# Patient Record
Sex: FEMALE | Race: BLACK OR AFRICAN AMERICAN
[De-identification: names, ages, dates, MRNs, and addresses within clinical notes are randomized per-mention and may not be internally consistent; named-entity substitution may affect disease eponyms.]

---

## 2018-04-24 ENCOUNTER — HOSPITAL ENCOUNTER (INPATIENT)
Dept: HOSPITAL 72 - EMR | Age: 83
LOS: 6 days | Discharge: NURSING, REHAB OR LONG-TERM CARE | DRG: 689 | End: 2018-04-30
Payer: MEDICARE

## 2018-04-24 VITALS — DIASTOLIC BLOOD PRESSURE: 89 MMHG | SYSTOLIC BLOOD PRESSURE: 168 MMHG

## 2018-04-24 VITALS — DIASTOLIC BLOOD PRESSURE: 73 MMHG | SYSTOLIC BLOOD PRESSURE: 136 MMHG

## 2018-04-24 VITALS — SYSTOLIC BLOOD PRESSURE: 151 MMHG | DIASTOLIC BLOOD PRESSURE: 57 MMHG

## 2018-04-24 VITALS — DIASTOLIC BLOOD PRESSURE: 71 MMHG | SYSTOLIC BLOOD PRESSURE: 164 MMHG

## 2018-04-24 VITALS — SYSTOLIC BLOOD PRESSURE: 116 MMHG | DIASTOLIC BLOOD PRESSURE: 69 MMHG

## 2018-04-24 VITALS — SYSTOLIC BLOOD PRESSURE: 96 MMHG | DIASTOLIC BLOOD PRESSURE: 55 MMHG

## 2018-04-24 VITALS — BODY MASS INDEX: 20.24 KG/M2 | HEIGHT: 62 IN | WEIGHT: 110 LBS

## 2018-04-24 DIAGNOSIS — G93.40: ICD-10-CM

## 2018-04-24 DIAGNOSIS — M81.0: ICD-10-CM

## 2018-04-24 DIAGNOSIS — N21.0: ICD-10-CM

## 2018-04-24 DIAGNOSIS — J44.9: ICD-10-CM

## 2018-04-24 DIAGNOSIS — E11.9: ICD-10-CM

## 2018-04-24 DIAGNOSIS — G89.4: ICD-10-CM

## 2018-04-24 DIAGNOSIS — E78.00: ICD-10-CM

## 2018-04-24 DIAGNOSIS — Z86.73: ICD-10-CM

## 2018-04-24 DIAGNOSIS — B96.20: ICD-10-CM

## 2018-04-24 DIAGNOSIS — R31.9: ICD-10-CM

## 2018-04-24 DIAGNOSIS — R33.9: ICD-10-CM

## 2018-04-24 DIAGNOSIS — K74.60: ICD-10-CM

## 2018-04-24 DIAGNOSIS — M15.9: ICD-10-CM

## 2018-04-24 DIAGNOSIS — Z88.6: ICD-10-CM

## 2018-04-24 DIAGNOSIS — F01.51: ICD-10-CM

## 2018-04-24 DIAGNOSIS — N31.2: ICD-10-CM

## 2018-04-24 DIAGNOSIS — N39.0: Primary | ICD-10-CM

## 2018-04-24 DIAGNOSIS — N81.10: ICD-10-CM

## 2018-04-24 LAB
ADD MANUAL DIFF: NO
ALBUMIN SERPL-MCNC: 3.1 G/DL (ref 3.4–5)
ALBUMIN/GLOB SERPL: 0.7 {RATIO} (ref 1–2.7)
ALP SERPL-CCNC: 67 U/L (ref 46–116)
ALT SERPL-CCNC: 23 U/L (ref 12–78)
ANION GAP SERPL CALC-SCNC: 6 MMOL/L (ref 5–15)
APPEARANCE UR: (no result)
APTT PPP: YELLOW S
AST SERPL-CCNC: 56 U/L (ref 15–37)
BASOPHILS NFR BLD AUTO: 2 % (ref 0–2)
BILIRUB SERPL-MCNC: 0.5 MG/DL (ref 0.2–1)
BUN SERPL-MCNC: 15 MG/DL (ref 7–18)
CALCIUM SERPL-MCNC: 9.5 MG/DL (ref 8.5–10.1)
CHLORIDE SERPL-SCNC: 107 MMOL/L (ref 98–107)
CO2 SERPL-SCNC: 26 MMOL/L (ref 21–32)
CREAT SERPL-MCNC: 0.7 MG/DL (ref 0.55–1.3)
EOSINOPHIL NFR BLD AUTO: 1.5 % (ref 0–3)
ERYTHROCYTE [DISTWIDTH] IN BLOOD BY AUTOMATED COUNT: 14.4 % (ref 11.6–14.8)
GLOBULIN SER-MCNC: 4.6 G/DL
GLUCOSE UR STRIP-MCNC: NEGATIVE MG/DL
HCT VFR BLD CALC: 36.2 % (ref 37–47)
HGB BLD-MCNC: 12 G/DL (ref 12–16)
KETONES UR QL STRIP: NEGATIVE
LEUKOCYTE ESTERASE UR QL STRIP: (no result)
LYMPHOCYTES NFR BLD AUTO: 30.8 % (ref 20–45)
MCV RBC AUTO: 91 FL (ref 80–99)
MONOCYTES NFR BLD AUTO: 9.2 % (ref 1–10)
NEUTROPHILS NFR BLD AUTO: 56.4 % (ref 45–75)
NITRITE UR QL STRIP: NEGATIVE
PH UR STRIP: 8 [PH] (ref 4.5–8)
PLATELET # BLD: 151 K/UL (ref 150–450)
POTASSIUM SERPL-SCNC: 4.9 MMOL/L (ref 3.5–5.1)
PROT UR QL STRIP: (no result)
RBC # BLD AUTO: 4 M/UL (ref 4.2–5.4)
SODIUM SERPL-SCNC: 139 MMOL/L (ref 136–145)
SP GR UR STRIP: 1.01 (ref 1–1.03)
UROBILINOGEN UR-MCNC: NORMAL MG/DL (ref 0–1)
WBC # BLD AUTO: 3.5 K/UL (ref 4.8–10.8)

## 2018-04-24 PROCEDURE — 86140 C-REACTIVE PROTEIN: CPT

## 2018-04-24 PROCEDURE — 80053 COMPREHEN METABOLIC PANEL: CPT

## 2018-04-24 PROCEDURE — 87181 SC STD AGAR DILUTION PER AGT: CPT

## 2018-04-24 PROCEDURE — 87086 URINE CULTURE/COLONY COUNT: CPT

## 2018-04-24 PROCEDURE — 85025 COMPLETE CBC W/AUTO DIFF WBC: CPT

## 2018-04-24 PROCEDURE — 83735 ASSAY OF MAGNESIUM: CPT

## 2018-04-24 PROCEDURE — 81003 URINALYSIS AUTO W/O SCOPE: CPT

## 2018-04-24 PROCEDURE — 74177 CT ABD & PELVIS W/CONTRAST: CPT

## 2018-04-24 PROCEDURE — 99285 EMERGENCY DEPT VISIT HI MDM: CPT

## 2018-04-24 PROCEDURE — 85007 BL SMEAR W/DIFF WBC COUNT: CPT

## 2018-04-24 PROCEDURE — 83036 HEMOGLOBIN GLYCOSYLATED A1C: CPT

## 2018-04-24 PROCEDURE — 83690 ASSAY OF LIPASE: CPT

## 2018-04-24 PROCEDURE — 84550 ASSAY OF BLOOD/URIC ACID: CPT

## 2018-04-24 PROCEDURE — 84100 ASSAY OF PHOSPHORUS: CPT

## 2018-04-24 PROCEDURE — 36415 COLL VENOUS BLD VENIPUNCTURE: CPT

## 2018-04-24 PROCEDURE — 84443 ASSAY THYROID STIM HORMONE: CPT

## 2018-04-24 PROCEDURE — 83880 ASSAY OF NATRIURETIC PEPTIDE: CPT

## 2018-04-24 RX ADMIN — DOCUSATE SODIUM SCH MG: 100 CAPSULE, LIQUID FILLED ORAL at 17:18

## 2018-04-24 NOTE — CONSULTATION
Consult Note


Consult Note


84-year-old female who presents with bilateral groin area pain and hematuria 

that started this morning


She straight caths herself for the past 13 years as she has a bladder prolapse 

issue and has difficulty emptying her bladder after a failed surgery to correct 

it


She denies fevers, nausea, vomiting, back pain, diarrhea, rectal bleeding, 

gynecologic complaints


She has not tried any medication for her pain, but reported constant achy and 

mild to moderate intensity, and she came mainly because she was worried about 

the blood she saw with catheterization


Allergies:  


Coded Allergies:  


     CODEINE (Verified  Allergy, Mild, Itching, 12/26/14)





Patient History


Past Medical History:  see triage record


Reviewed Nursing Documentation:  PMH: Agreed; PSxH: Agreed








Hx Cardiac Problems:  Yes


Hx Hypertension:  Yes


Hx Asthma:  Yes


Hx Diabetes:  Yes


Hx Cerebrovascular Accident:  Yes


Assessment/Plan





UTI


Bladder Prolapse


Psych Ds








tomas


Rocephin


? Uro eval


monitor renal parameters











NO WANG Apr 24, 2018 16:49

## 2018-04-24 NOTE — EMERGENCY ROOM REPORT
History of Present Illness


General


Chief Complaint:  Abdominal Pain


Source:  Patient, EMS





Present Illness


HPI


84-year-old female who presents with bilateral groin area pain and hematuria 

that started this morning


She straight caths herself for the past 13 years as she has a bladder prolapse 

issue and has difficulty emptying her bladder after a failed surgery to correct 

it


She denies fevers, nausea, vomiting, back pain, diarrhea, rectal bleeding, 

gynecologic complaints


She has not tried any medication for her pain, but reported constant achy and 

mild to moderate intensity, and she came mainly because she was worried about 

the blood she saw with catheterization


Allergies:  


Coded Allergies:  


     CODEINE (Verified  Allergy, Mild, Itching, 12/26/14)





Patient History


Past Medical History:  see triage record


Reviewed Nursing Documentation:  PMH: Agreed; PSxH: Agreed





Nursing Documentation-PMH


Hx Cardiac Problems:  Yes


Hx Hypertension:  Yes


Hx Asthma:  Yes


Hx Diabetes:  Yes


Hx Cancer:  No


Hx Gastrointestinal Problems:  No


Hx Cerebrovascular Accident:  Yes





Review of Systems


All Other Systems:  negative except mentioned in HPI





Physical Exam





Vital Signs








  Date Time  Temp Pulse Resp B/P (MAP) Pulse Ox O2 Delivery O2 Flow Rate FiO2


 


4/24/18 10:03 97.7 86 18 154/94 96 Room Air  





 97.7       








Sp02 EP Interpretation:  reviewed, normal


General Appearance:  no apparent distress, alert, non-toxic


Head:  normocephalic


Eyes:  bilateral eye normal inspection, bilateral eye PERRL, bilateral eye EOMI


ENT:  normal ENT inspection, hearing grossly normal, normal pharynx, no 

angioedema, normal voice, moist mucus membranes


Neck:  normal inspection, full range of motion, supple, supple/symm/no masses


Respiratory:  chest non-tender, lungs clear, normal breath sounds, chest 

symmetrical, palpation of chest normal


Cardiovascular #1:  normal peripheral pulses, regular rate, rhythm


Cardiovascular #2:  2+ radial (R), 2+ radial (L)


Gastrointestinal:  normal inspection, non tender, soft, no mass, no guarding, 

no rebound


Rectal:  deferred


Genitourinary:  normal inspection, no CVA tenderness


Musculoskeletal:  back normal, gait/station normal, normal range of motion, non-

tender, no calf tenderness


Neurologic:  alert, responsive, CNs III-XII nml as tested, motor strength/tone 

normal, sensory intact, speech normal


Psychiatric:  judgement/insight normal, memory normal, mood/affect normal, no 

suicidal/homicidal ideation


Skin:  normal color, no rash, warm/dry, normal turgor


Lymphatic:  no adenopathy





Medical Decision Making


Diagnostic Impression:  


 Primary Impression:  


 Urinary tract infection


 Additional Impression:  


 Hematuria


ER Course


84-year-old female who straight caths and has UTI with normal labs.  Treated 

UTI with IV Rocephin, will need to admit as recent Urine cx with MDR E coli.   

Still pending CT abdomen and pelvis, will admit.


Rhythm Strip Diag. Results


Rhythm Strip Time:  13:55


EP Interpretation:  yes


Rate:  63


Rhythm:  NSR, no PVC's, no ectopy





Last Vital Signs








  Date Time  Temp Pulse Resp B/P (MAP) Pulse Ox O2 Delivery O2 Flow Rate FiO2


 


4/24/18 10:03 97.7 86 18 154/94 96 Room Air  





 97.7       








Disposition:  ADMITTED AS INPATIENT


Condition:  Stable


Signed Out To:  ERIC Triplett M.D Apr 24, 2018 10:24

## 2018-04-24 NOTE — CONSULTATION
History of Present Illness


General


Date patient seen:  Apr 24, 2018


Time patient seen:  18:48


Chief Complaint:  Abdominal Pain


Referring physician:  Irena


Reason for Consultation:  Urinary retention





Present Illness


HPI


83 yo female with hx of bladder dysfunction/prolapse.  Failed repair.  Self 

catheterization dependent for past few years.  Noted hematuria on 

catheterization and abdominal pain.  Has had UTIs before.


Allergies:  


Coded Allergies:  


     CODEINE (Verified  Allergy, Mild, Itching, 12/26/14)





Medication History


Scheduled


Amlodipine Besylate (Norvasc), 5 MG ORAL DAILY


Aspirin (Ecotrin), 81 MG PO DAILY, (Reported)


Aspirin* (Aspir 81*), 81 MG ORAL DAILY, (Reported)


Docusate Sodium (Dok), 100 MG PO BID, (Reported)


Donepezil Hcl* (Donepezil Hcl*), 10 MG ORAL DAILY, (Reported)


Gabapentin* (Gabapentin*), 100 MG ORAL BID, (Reported)


Irbesartan* (Avapro*), 150 MG ORAL DAILY


Levofloxacin (Levofloxacin*), 500 MG ORAL DAILY, (Reported)


Megestrol Acetate (Megestrol Acetate), 40 MG PO BID, (Reported)


Nitrofurantoin Macrocrystal (Nitrofurantoin), 100 MG PO BID, (Reported)


Omega-3 Fatty Acids/Fish Oil (Fish Oil 1,000 Mg Capsule), 1 CAP ORAL DAILY, (

Reported)


Oxcarbazepine (Oxcarbazepine), 150 MG PO DAILY, (Reported)


Risperidone* (Risperdal*), 0.5 MG ORAL Q12HR


Simvastatin (Zocor), 20 MG ORAL DAILY, (Reported)


Thiamine Hcl (Vitamin B1*), 100 MG ORAL DAILY


Vitamin D (Vitamin D3), 1,000 UNITS ORAL DAILY, (Reported)





Scheduled PRN


Melatonin (Melatonin), 3 MG ORAL BEDTIME PRN for Insomnia, (Reported)





Miscellaneous Medications


[Simvastatin], (Reported)


[klonopin], (Reported)





Patient History


History Provided By:  Patient


Healthcare decision maker


N


Resuscitation status





Advanced Directive on File








Past Medical/Surgical History


Past Medical/Surgical History:  


(1) Vascular dementia with delirium


(2) Arterial ischemic stroke, multifocal, multiple vascular territories, chronic


(3) Stroke


(4) Urinary tract infection





Review of Systems


All Other Systems:  negative except mentioned in HPI





Physical Exam


General Appearance:  no apparent distress


HEENT:  normocephalic, atraumatic


Neck:  supple


Respiratory/Chest:  lungs clear


Cardiovascular/Chest:  normal rate


Abdomen:  soft


Genitourinary/Rectal:  normal genital exam





Last 24 Hour Vital Signs








  Date Time  Temp Pulse Resp B/P (MAP) Pulse Ox O2 Delivery O2 Flow Rate FiO2


 


4/24/18 16:19  68 17 148/67 98 Room Air  


 


4/24/18 16:00 97.3 57 19 148/73 94   





 97.3       


 


4/24/18 14:56  76 17 168/89 99 Room Air  


 


4/24/18 13:01  63 16 164/71 97 Room Air  


 


4/24/18 11:52 97.7       


 


4/24/18 11:18  64 19 151/57 100 Room Air  


 


4/24/18 11:13 97.7       


 


4/24/18 10:03 97.7 86 18 154/94 96 Room Air  





 97.7       











Laboratory Tests








Test


  4/24/18


10:32 4/24/18


10:39


 


White Blood Count


  3.5 K/UL


(4.8-10.8)  L 


 


 


Red Blood Count


  4.00 M/UL


(4.20-5.40)  L 


 


 


Hemoglobin


  12.0 G/DL


(12.0-16.0) 


 


 


Hematocrit


  36.2 %


(37.0-47.0)  L 


 


 


Mean Corpuscular Volume 91 FL (80-99)   


 


Mean Corpuscular Hemoglobin


  30.0 PG


(27.0-31.0) 


 


 


Mean Corpuscular Hemoglobin


Concent 33.1 G/DL


(32.0-36.0) 


 


 


Red Cell Distribution Width


  14.4 %


(11.6-14.8) 


 


 


Platelet Count


  151 K/UL


(150-450) 


 


 


Mean Platelet Volume


  8.8 FL


(6.5-10.1) 


 


 


Neutrophils (%) (Auto)


  56.4 %


(45.0-75.0) 


 


 


Lymphocytes (%) (Auto)


  30.8 %


(20.0-45.0) 


 


 


Monocytes (%) (Auto)


  9.2 %


(1.0-10.0) 


 


 


Eosinophils (%) (Auto)


  1.5 %


(0.0-3.0) 


 


 


Basophils (%) (Auto)


  2.0 %


(0.0-2.0) 


 


 


Sodium Level


  139 MMOL/L


(136-145) 


 


 


Potassium Level


  4.9 MMOL/L


(3.5-5.1) 


 


 


Chloride Level


  107 MMOL/L


() 


 


 


Carbon Dioxide Level


  26 MMOL/L


(21-32) 


 


 


Anion Gap


  6 mmol/L


(5-15) 


 


 


Blood Urea Nitrogen


  15 mg/dL


(7-18) 


 


 


Creatinine


  0.7 MG/DL


(0.55-1.30) 


 


 


Estimat Glomerular Filtration


Rate  mL/min (>60)  


  


 


 


Glucose Level


  85 MG/DL


() 


 


 


Calcium Level


  9.5 MG/DL


(8.5-10.1) 


 


 


Total Bilirubin


  0.5 MG/DL


(0.2-1.0) 


 


 


Aspartate Amino Transf


(AST/SGOT) 56 U/L (15-37)


H 


 


 


Alanine Aminotransferase


(ALT/SGPT) 23 U/L (12-78)


  


 


 


Alkaline Phosphatase


  67 U/L


() 


 


 


C-Reactive Protein,


Quantitative 0.9 mg/dL


(0.00-0.90) 


 


 


Total Protein


  7.7 G/DL


(6.4-8.2) 


 


 


Albumin


  3.1 G/DL


(3.4-5.0)  L 


 


 


Globulin 4.6 g/dL   


 


Albumin/Globulin Ratio


  0.7 (1.0-2.7)


L 


 


 


Lipase


  179 U/L


() 


 


 


Urine Color  Yellow  


 


Urine Appearance  Turbid  


 


Urine pH  8 (4.5-8.0)  


 


Urine Specific Gravity


  


  1.015


(1.005-1.035)


 


Urine Protein


  


  2+ (NEGATIVE)


H


 


Urine Glucose (UA)


  


  Negative


(NEGATIVE)


 


Urine Ketones


  


  Negative


(NEGATIVE)


 


Urine Occult Blood


  


  4+ (NEGATIVE)


H


 


Urine Nitrite


  


  Negative


(NEGATIVE)


 


Urine Bilirubin


  


  Negative


(NEGATIVE)


 


Urine Urobilinogen


  


  Normal MG/DL


(0.0-1.0)


 


Urine Leukocyte Esterase


  


  3+ (NEGATIVE)


H


 


Urine RBC


  


  5-10 /HPF (0 -


2)  H


 


Urine WBC


  


  30-40 /HPF (0


- 2)  H


 


Urine Squamous Epithelial


Cells 


  Moderate /LPF


(NONE/OCC)  H


 


Urine Bacteria


  


  Many /HPF


(NONE)  H








Height (Feet):  5


Height (Inches):  2.00


Weight (Pounds):  110


Medications





Current Medications








 Medications


  (Trade)  Dose


 Ordered  Sig/Carl


 Route


 PRN Reason  Start Time


 Stop Time Status Last Admin


Dose Admin


 


 Amlodipine


 Besylate


  (Norvasc)  5 mg  DAILY


 ORAL


   4/25/18 09:00


 5/25/18 08:59   


 


 


 Aspirin


  (Ecotrin)  81 mg  DAILY


 ORAL


   4/25/18 09:00


 5/25/18 08:59   


 


 


 Atorvastatin


 Calcium


  (Lipitor)  10 mg  QHS


 ORAL


   4/24/18 21:00


 5/24/18 20:59   


 


 


 Ceftriaxone


 Sodium 1 gm/


 Dextrose  55 ml @ 


 110 mls/hr  Q24H


 IVPB


   4/25/18 13:00


 5/2/18 12:59   


 


 


 Docusate Sodium


  (Colace)  100 mg  BID


 ORAL


   4/24/18 18:00


 5/24/18 17:59  4/24/18 17:18


 


 


 Donepezil HCl


  (Aricept)  10 mg  DAILY


 ORAL


   4/25/18 09:00


 5/25/18 08:59   


 


 


 Fish Oil


  (Fish Oil)  1,000 mg  DAILY


 ORAL


   4/25/18 09:00


 5/25/18 08:59   


 


 


 Gabapentin


  (Neurontin)  100 mg  BID


 ORAL


   4/24/18 18:00


 5/24/18 17:59  4/24/18 17:18


 


 


 Irbesartan


  (Avapro)  150 mg  DAILY


 ORAL


   4/25/18 09:00


 5/25/18 08:59   


 


 


 Megestrol Acetate


  (Megace)  40 mg  TWICE A  DAY


 ORAL


   4/24/18 18:00


 5/24/18 17:59  4/24/18 17:18


 


 


 Oxcarbazepine


  (Trileptal)  150 mg  DAILY


 ORAL


   4/25/18 09:00


 5/25/18 08:59   


 


 


 Risperidone


  (RisperDAL)  0.5 mg  Q12H  PRN


 ORAL


 Agitation  4/24/18 17:00


 5/24/18 16:59   


 


 


 Thiamine HCl


  (Vitamin B1)  100 mg  DAILY


 ORAL


   4/25/18 09:00


 5/25/18 08:59   


 


 


 Vitamin D


  (Vitamin D)  1,000 intlu  DAILY


 ORAL


   4/25/18 09:00


 5/25/18 08:59   


 








Objective Narrative


CT reviewed: distended bladder, thickened, small stones in bladder





Procedure: Under sterile conditions 18 [place] tomas catheter placed.  No blood 

seen.  Irrigated and no clots seen.





Assessment/Plan


Status:  stable


Assessment/Plan


83 yo female with nonfunctional bladder.  Self catheterizes 2-3 times a day.  

Recent hematuria noted.  UTI on workup.  Given bladder stones and chronic 

retention, tomas placed.  Recommend keeping tomas for duration of treatment.  

Given complex nature of UTI and likely drug resistance recommend 10-14 days.





1. IV abx


2. f/u urine culture


3. continue tomas till UTI treatment complete


4. f/u own urologist, Dr. Callahan.











Giovanni Bettencourt M.D. Apr 24, 2018 18:52

## 2018-04-24 NOTE — DIAGNOSTIC IMAGING REPORT
Clinical Indication: Abdominal pain, bilateral groin area pain and hematuria started

this morning

 

Technique:   Patient given oral contrast.  IV administration nonionic contrast.

Venous phase spiral acquisition obtained through the abdomen and pelvis. Multiplanar

reconstructions were generated. Total dose length product 578.51 mGycm. CTDIvol(s)

12.1 mGy. Dose reduction achieved using automated exposure control

 

 

Comparison: none

 

Findings: There are occasional colonic diverticula. No evidence of diverticulitis.

Moderate amount of retained stool is seen within the colon. The appendix is not

definitely visualized, but there are no findings to suggest acute appendicitis. No

small bowel distention or small bowel wall thickening. Contrast is seen throughout

the entirety of the small bowel and well into the colon. There is some wall

thickening of the distal esophagus. The stomach is grossly unremarkable. The duodenum

is unremarkable. No free intraperitoneal air is evident. A small amount of fluid is

seen within the pelvis and over the dome of the liver.

 

The liver demonstrates surface nodularity. Recannulized paraumbilical vein varices

are demonstrated. The portal vein is somewhat dilated. No focal hepatic abnormality

demonstrated. The gallbladder is unremarkable. The pancreas, spleen, adrenals,

kidneys are unremarkable. No retroperitoneal or mesenteric mass or adenopathy. No

pelvic mass or adenopathy. The bladder demonstrates diffuse wall thickening. Calculi

are seen dependently within the bladder lumen posteriorly. Some of these may be

within the bladder wall as well.

 

The included lung bases are clear except for some posterior dependent atelectatic

changes. The bones demonstrate a anterior wedge/burst compression fracture deformity

of the T11 vertebral body, with considerable retropulsion of the posterior wall. The

acuity of this is indeterminate, but sclerosis of the posterior body suggests that

this is chronic. There is also a less severe wedge compression fracture deformity of

the T9 vertebral body. There are degenerative changes of the lower lumbar spine.

There is suggestion of slight superior endplate compression deformities of the L3 and

L5 vertebral segments.

 

Impression: Thick walled bladder. This could indicate cystitis or chronic bladder

outlet obstruction. Dependent calcifications are seen within the bladder lumen. Some

of these may also be within the bladder wall.

 

Hepatic surface nodularity consistent with cirrhosis

 

Evidence of portal hypertension, with recanalized periumbilical vein varices and

trace ascites

 

Colonic diverticulosis. No evidence of diverticulitis

 

Moderate retained stool; correlate with any history of constipation

 

Distal esophageal mild wall thickening, could indicate esophagitis. Correlate with

clinical findings

 

Anterior wedge/burst compression fracture deformity of the T11 vertebral body with

posterior retropulsion. Age indeterminate although suspect chronic. Consider MRI for

further evaluation if this is symptomatic or otherwise clinically relevant

 

Age-indeterminate less severe wedge compression fracture deformity of the T9

vertebral body. There are are also possible slight superior endplate compression

deformities of the L3 and L5 vertebral bodies

 

Posterior dependent pulmonary atelectasis, degenerative lumbar spondylosis

incidentally noted

 

The CT scanner at [facility] is accredited by the [employer] College of

Radiology and the scans are performed using protocols designed to limit radiation

exposure to as low as reasonably achievable to attain images of sufficient resolution

adequate for diagnostic evaluation.

## 2018-04-25 VITALS — DIASTOLIC BLOOD PRESSURE: 57 MMHG | SYSTOLIC BLOOD PRESSURE: 109 MMHG

## 2018-04-25 VITALS — SYSTOLIC BLOOD PRESSURE: 121 MMHG | DIASTOLIC BLOOD PRESSURE: 79 MMHG

## 2018-04-25 VITALS — DIASTOLIC BLOOD PRESSURE: 71 MMHG | SYSTOLIC BLOOD PRESSURE: 149 MMHG

## 2018-04-25 VITALS — DIASTOLIC BLOOD PRESSURE: 64 MMHG | SYSTOLIC BLOOD PRESSURE: 118 MMHG

## 2018-04-25 LAB
ADD MANUAL DIFF: NO
ALBUMIN SERPL-MCNC: 3.2 G/DL (ref 3.4–5)
ALBUMIN/GLOB SERPL: 0.8 {RATIO} (ref 1–2.7)
ALP SERPL-CCNC: 67 U/L (ref 46–116)
ALT SERPL-CCNC: 23 U/L (ref 12–78)
ANION GAP SERPL CALC-SCNC: 9 MMOL/L (ref 5–15)
AST SERPL-CCNC: 27 U/L (ref 15–37)
BASOPHILS NFR BLD AUTO: 1.8 % (ref 0–2)
BILIRUB SERPL-MCNC: 0.4 MG/DL (ref 0.2–1)
BUN SERPL-MCNC: 12 MG/DL (ref 7–18)
CALCIUM SERPL-MCNC: 9.4 MG/DL (ref 8.5–10.1)
CHLORIDE SERPL-SCNC: 104 MMOL/L (ref 98–107)
CO2 SERPL-SCNC: 27 MMOL/L (ref 21–32)
CREAT SERPL-MCNC: 0.8 MG/DL (ref 0.55–1.3)
EOSINOPHIL NFR BLD AUTO: 1.1 % (ref 0–3)
ERYTHROCYTE [DISTWIDTH] IN BLOOD BY AUTOMATED COUNT: 14.3 % (ref 11.6–14.8)
GLOBULIN SER-MCNC: 4.2 G/DL
HCT VFR BLD CALC: 35.1 % (ref 37–47)
HGB BLD-MCNC: 12 G/DL (ref 12–16)
LYMPHOCYTES NFR BLD AUTO: 30.5 % (ref 20–45)
MCV RBC AUTO: 91 FL (ref 80–99)
MONOCYTES NFR BLD AUTO: 8.8 % (ref 1–10)
NEUTROPHILS NFR BLD AUTO: 57.8 % (ref 45–75)
PHOSPHATE SERPL-MCNC: 3.9 MG/DL (ref 2.5–4.9)
PLATELET # BLD: 166 K/UL (ref 150–450)
POTASSIUM SERPL-SCNC: 3.8 MMOL/L (ref 3.5–5.1)
RBC # BLD AUTO: 3.86 M/UL (ref 4.2–5.4)
SODIUM SERPL-SCNC: 140 MMOL/L (ref 136–145)
WBC # BLD AUTO: 3.9 K/UL (ref 4.8–10.8)

## 2018-04-25 RX ADMIN — VITAMIN D, TAB 1000IU (100/BT) SCH INTLU: 25 TAB at 08:18

## 2018-04-25 RX ADMIN — DEXTROSE MONOHYDRATE SCH MLS/HR: 50 INJECTION, SOLUTION INTRAVENOUS at 21:59

## 2018-04-25 RX ADMIN — Medication SCH MG: at 08:18

## 2018-04-25 RX ADMIN — DOCUSATE SODIUM SCH MG: 100 CAPSULE, LIQUID FILLED ORAL at 16:57

## 2018-04-25 RX ADMIN — OXCARBAZEPINE SCH MG: 150 TABLET, FILM COATED ORAL at 08:18

## 2018-04-25 RX ADMIN — ANALGESIC BALM SCH APPLIC: 1.74; 4.06 OINTMENT TOPICAL at 21:00

## 2018-04-25 RX ADMIN — ASPIRIN SCH MG: 81 TABLET, DELAYED RELEASE ORAL at 09:00

## 2018-04-25 RX ADMIN — DONEPEZIL HYDROCHLORIDE SCH MG: 10 TABLET, FILM COATED ORAL at 08:17

## 2018-04-25 RX ADMIN — DOCUSATE SODIUM SCH MG: 100 CAPSULE, LIQUID FILLED ORAL at 08:17

## 2018-04-25 RX ADMIN — ASPIRIN SCH MG: 81 TABLET, DELAYED RELEASE ORAL at 08:17

## 2018-04-25 RX ADMIN — DEXTROSE MONOHYDRATE SCH MLS/HR: 50 INJECTION, SOLUTION INTRAVENOUS at 13:52

## 2018-04-25 RX ADMIN — IRBESARTAN SCH MG: 150 TABLET, FILM COATED ORAL at 08:18

## 2018-04-25 NOTE — CONSULTATION
History of Present Illness


General


Date patient seen:  Apr 25, 2018


Chief Complaint:  Abdominal Pain


Referring physician:  Irena


Reason for Consultation:  Urinary retention





Present Illness


Allergies:  


Coded Allergies:  


     CODEINE (Verified  Allergy, Mild, Itching, 12/26/14)





Medication History


Scheduled


Amlodipine Besylate (Norvasc), 5 MG ORAL DAILY


Aspirin (Ecotrin), 81 MG PO DAILY, (Reported)


Aspirin* (Aspir 81*), 81 MG ORAL DAILY, (Reported)


Docusate Sodium (Dok), 100 MG PO BID, (Reported)


Donepezil Hcl* (Donepezil Hcl*), 10 MG ORAL DAILY, (Reported)


Gabapentin* (Gabapentin*), 100 MG ORAL BID, (Reported)


Irbesartan* (Avapro*), 150 MG ORAL DAILY


Levofloxacin (Levofloxacin*), 500 MG ORAL DAILY, (Reported)


Megestrol Acetate (Megestrol Acetate), 40 MG PO BID, (Reported)


Nitrofurantoin Macrocrystal (Nitrofurantoin), 100 MG PO BID, (Reported)


Omega-3 Fatty Acids/Fish Oil (Fish Oil 1,000 Mg Capsule), 1 CAP ORAL DAILY, (

Reported)


Oxcarbazepine (Oxcarbazepine), 150 MG PO DAILY, (Reported)


Risperidone* (Risperdal*), 0.5 MG ORAL Q12HR


Simvastatin (Zocor), 20 MG ORAL DAILY, (Reported)


Thiamine Hcl (Vitamin B1*), 100 MG ORAL DAILY


Vitamin D (Vitamin D3), 1,000 UNITS ORAL DAILY, (Reported)





Scheduled PRN


Melatonin (Melatonin), 3 MG ORAL BEDTIME PRN for Insomnia, (Reported)





Miscellaneous Medications


[Simvastatin], (Reported)


[klonopin], (Reported)





Patient History


Healthcare decision maker


N


Resuscitation status





Advanced Directive on File








Physical Exam





Last 24 Hour Vital Signs








  Date Time  Temp Pulse Resp B/P (MAP) Pulse Ox O2 Delivery O2 Flow Rate FiO2


 


4/25/18 16:00 97.6 70 19 118/64 98   





 97.6       


 


4/25/18 12:00 99.5 72 20 109/57 97   





 99.5       


 


4/25/18 08:51 97.9 74 16 149/71 96   





 97.9       


 


4/25/18 08:19  74  149/71    


 


4/25/18 08:18    149/71    


 


4/24/18 23:50 97.2 60 18 96/55 97   





 97.2       


 


4/24/18 20:00 97.5 62 18 116/69 97   





 97.5       

















Intake and Output  


 


 4/24/18 4/25/18





 19:00 07:00


 


Intake Total 795 ml 295 ml


 


Output Total 75 ml 1450 ml


 


Balance 720 ml -1155 ml


 


  


 


Intake Oral 240 ml 295 ml


 


IV Total 555 ml 


 


Output Urine Total 75 ml 1450 ml











Laboratory Tests








Test


  4/25/18


10:50


 


White Blood Count


  3.9 K/UL


(4.8-10.8)  L


 


Red Blood Count


  3.86 M/UL


(4.20-5.40)  L


 


Hemoglobin


  12.0 G/DL


(12.0-16.0)


 


Hematocrit


  35.1 %


(37.0-47.0)  L


 


Mean Corpuscular Volume 91 FL (80-99)  


 


Mean Corpuscular Hemoglobin


  31.0 PG


(27.0-31.0)


 


Mean Corpuscular Hemoglobin


Concent 34.1 G/DL


(32.0-36.0)


 


Red Cell Distribution Width


  14.3 %


(11.6-14.8)


 


Platelet Count


  166 K/UL


(150-450)


 


Mean Platelet Volume


  9.5 FL


(6.5-10.1)


 


Neutrophils (%) (Auto)


  57.8 %


(45.0-75.0)


 


Lymphocytes (%) (Auto)


  30.5 %


(20.0-45.0)


 


Monocytes (%) (Auto)


  8.8 %


(1.0-10.0)


 


Eosinophils (%) (Auto)


  1.1 %


(0.0-3.0)


 


Basophils (%) (Auto)


  1.8 %


(0.0-2.0)


 


Sodium Level


  140 MMOL/L


(136-145)


 


Potassium Level


  3.8 MMOL/L


(3.5-5.1)


 


Chloride Level


  104 MMOL/L


()


 


Carbon Dioxide Level


  27 MMOL/L


(21-32)


 


Anion Gap


  9 mmol/L


(5-15)


 


Blood Urea Nitrogen


  12 mg/dL


(7-18)


 


Creatinine


  0.8 MG/DL


(0.55-1.30)


 


Estimat Glomerular Filtration


Rate  mL/min (>60)  


 


 


Glucose Level


  77 MG/DL


()


 


Hemoglobin A1c


  5.3 %


(4.3-6.0)


 


Uric Acid


  4.4 MG/DL


(2.6-7.2)


 


Calcium Level


  9.4 MG/DL


(8.5-10.1)


 


Phosphorus Level


  3.9 MG/DL


(2.5-4.9)


 


Magnesium Level


  1.9 MG/DL


(1.8-2.4)


 


Total Bilirubin


  0.4 MG/DL


(0.2-1.0)


 


Aspartate Amino Transf


(AST/SGOT) 27 U/L (15-37)


 


 


Alanine Aminotransferase


(ALT/SGPT) 23 U/L (12-78)


 


 


Alkaline Phosphatase


  67 U/L


()


 


Pro-B-Type Natriuretic Peptide


  109 pg/mL


(0-125)


 


Total Protein


  7.4 G/DL


(6.4-8.2)


 


Albumin


  3.2 G/DL


(3.4-5.0)  L


 


Globulin 4.2 g/dL  


 


Albumin/Globulin Ratio


  0.8 (1.0-2.7)


L


 


Thyroid Stimulating Hormone


(TSH) 1.838 uiU/mL


(0.358-3.740)








Height (Feet):  5


Height (Inches):  2.00


Weight (Pounds):  110


Medications





Current Medications








 Medications


  (Trade)  Dose


 Ordered  Sig/Carl


 Route


 PRN Reason  Start Time


 Stop Time Status Last Admin


Dose Admin


 


 Acetaminophen


  (Tylenol)  650 mg  QIDPRN  PRN


 ORAL


 Mild Pain/Temp > 100.5  4/25/18 09:15


 5/25/18 09:14  4/25/18 09:22


 


 


 Amlodipine


 Besylate


  (Norvasc)  5 mg  DAILY


 ORAL


   4/25/18 09:00


 5/25/18 08:59  4/25/18 08:19


 


 


 Aspirin


  (Ecotrin)  81 mg  DAILY


 ORAL


   4/25/18 09:00


 5/25/18 08:59   


 


 


 Atorvastatin


 Calcium


  (Lipitor)  10 mg  QHS


 ORAL


   4/24/18 21:00


 5/24/18 20:59  4/24/18 21:28


 


 


 Docusate Sodium


  (Colace)  100 mg  BID


 ORAL


   4/24/18 18:00


 5/24/18 17:59  4/25/18 16:57


 


 


 Donepezil HCl


  (Aricept)  10 mg  DAILY


 ORAL


   4/25/18 09:00


 5/25/18 08:59  4/25/18 08:17


 


 


 Fish Oil


  (Fish Oil)  1,000 mg  DAILY


 ORAL


   4/25/18 09:00


 5/25/18 08:59  4/25/18 08:17


 


 


 Gabapentin


  (Neurontin)  100 mg  BID


 ORAL


   4/24/18 18:00


 5/24/18 17:59  4/25/18 16:57


 


 


 Irbesartan


  (Avapro)  150 mg  DAILY


 ORAL


   4/25/18 09:00


 5/25/18 08:59  4/25/18 08:18


 


 


 Megestrol Acetate


  (Megace)  40 mg  TWICE A  DAY


 ORAL


   4/24/18 18:00


 5/24/18 17:59  4/25/18 16:57


 


 


 Oxcarbazepine


  (Trileptal)  150 mg  DAILY


 ORAL


   4/25/18 09:00


 5/25/18 08:59  4/25/18 08:18


 


 


 Piperacillin Sod/


 Tazobactam Sod


 3.375 gm/Dextrose  110 ml @ 


 27.5 mls/hr  EVERY 8  HOURS


 IVPB


   4/25/18 14:00


 4/30/18 13:59  4/25/18 13:52


 


 


 Risperidone


  (RisperDAL)  0.5 mg  Q12H  PRN


 ORAL


 Agitation  4/24/18 17:00


 5/24/18 16:59   


 


 


 Thiamine HCl


  (Vitamin B1)  100 mg  DAILY


 ORAL


   4/25/18 09:00


 5/25/18 08:59  4/25/18 08:18


 


 


 Vitamin D


  (Vitamin D)  1,000 intlu  DAILY


 ORAL


   4/25/18 09:00


 5/25/18 08:59  4/25/18 08:18


 











Assessment/Plan


Assessment/Plan


(1) Multiple Joint OA


(2) Multiple Joint Pain


seen dictated











KVNG DOMÍNGUEZ Apr 25, 2018 19:04

## 2018-04-25 NOTE — CONSULTATION
DATE OF CONSULTATION:  04/25/2018



INFECTIOUS DISEASE CONSULTATION



CONSULTING PHYSICIAN:  Jarrett Doherty M.D.



PRIMARY ATTENDING PHYSICIAN:  Kylah Osborn M.D.



REASON FOR CONSULT:  Complicated UTI.



HISTORY OF PRESENT ILLNESS:  This is an 84-year-old 

female, admitted yesterday complaining of abdominal pain and hematuria.

The patient has history of bladder prolapse and had failed correction surgery

many years ago, and in the past 13 years, she used self-catheterization

two to three times a day.  At the time of admission, had groin pain

bilaterally.



PAST MEDICAL HISTORY:  Significant for urinary retention, bladder prolapse,

and dementia.



ALLERGIES:  The patient is allergic to codeine.



MEDICATIONS:  Getting ceftriaxone, Tylenol, Norvasc, aspirin, thiamine,

Aricept, fish oil, Trileptal, Lipitor, Colace, Megace, and Risperdal.



SOCIAL HISTORY:  Lives at home.  No history of drinking for 25 years.  Quit

smoking many years ago.  .



REVIEW OF SYSTEMS:  She has poor appetite.  No nausea.  No vomiting.  No

diarrhea.  No fever.  At the time of examination, no abdominal pain, but

has left thigh pain.  Has on and off back pain.



PHYSICAL EXAMINATION:

VITAL SIGNS:  Temperature 97.9 degrees, pulse 74, and blood pressure is

149/71.

GENERAL APPEARANCE:  No acute distress.

HEAD AND NECK:  Has denture.  No oral lesion.

HEART:  Regular.

LUNGS:  Clear.

ABDOMEN:  Soft.

EXTREMITIES:  She has no edema.

GENITOURINARY:  She has Torres catheter with hematuria.



LABORATORY AND DIAGNOSTIC DATA:  WBC 3.5, hemoglobin 12, hematocrit 36.2,

and platelets 151.  Sodium 139, potassium 4.9, chloride 107, bicarb 26,

BUN 15, creatinine 0.5, and glucose 85.  AST was elevated at 56.  The

patient had a CT scan of the abdomen and pelvis, which showed evidence of

thickened bladder wall, likely cystitis, cirrhosis with portal

hypertension, diverticulosis without diverticulitis, compression fracture

of T9 and T11.  Blood culture growing Gram-negative bacilli and

gram-positive organism.



IMPRESSION:  Complicated urinary tract infection in a patient, who had

history of bladder prolapse and dysfunctioning.  Has hematuria.  Has

evidence of cirrhosis, portal hypertension, diverticulosis, and has

dementia.



RECOMMENDATION:  We will change antibiotic from Rocephin to Zosyn.  We will

follow up the cultures.



At the end of my exam, I thank Dr. Osborn for involving me in the care

of this patient.









  ______________________________________________

  Jarrett Doherty M.D.





DR:  PRINCESS

D:  04/25/2018 11:21

T:  04/25/2018 21:28

JOB#:  [id number]

CC:



LEX

## 2018-04-26 VITALS — DIASTOLIC BLOOD PRESSURE: 55 MMHG | SYSTOLIC BLOOD PRESSURE: 98 MMHG

## 2018-04-26 VITALS — SYSTOLIC BLOOD PRESSURE: 158 MMHG | DIASTOLIC BLOOD PRESSURE: 85 MMHG

## 2018-04-26 VITALS — DIASTOLIC BLOOD PRESSURE: 57 MMHG | SYSTOLIC BLOOD PRESSURE: 98 MMHG

## 2018-04-26 VITALS — DIASTOLIC BLOOD PRESSURE: 62 MMHG | SYSTOLIC BLOOD PRESSURE: 101 MMHG

## 2018-04-26 LAB
ADD MANUAL DIFF: YES
ALBUMIN SERPL-MCNC: 2.7 G/DL (ref 3.4–5)
ALBUMIN/GLOB SERPL: 0.7 {RATIO} (ref 1–2.7)
ALP SERPL-CCNC: 59 U/L (ref 46–116)
ALT SERPL-CCNC: 19 U/L (ref 12–78)
ANION GAP SERPL CALC-SCNC: 10 MMOL/L (ref 5–15)
AST SERPL-CCNC: 27 U/L (ref 15–37)
BILIRUB SERPL-MCNC: 0.3 MG/DL (ref 0.2–1)
BUN SERPL-MCNC: 14 MG/DL (ref 7–18)
CALCIUM SERPL-MCNC: 8.9 MG/DL (ref 8.5–10.1)
CHLORIDE SERPL-SCNC: 107 MMOL/L (ref 98–107)
CO2 SERPL-SCNC: 25 MMOL/L (ref 21–32)
CREAT SERPL-MCNC: 1 MG/DL (ref 0.55–1.3)
ERYTHROCYTE [DISTWIDTH] IN BLOOD BY AUTOMATED COUNT: 14.2 % (ref 11.6–14.8)
GLOBULIN SER-MCNC: 3.8 G/DL
HCT VFR BLD CALC: 32.7 % (ref 37–47)
HGB BLD-MCNC: 11.1 G/DL (ref 12–16)
MCV RBC AUTO: 90 FL (ref 80–99)
PLATELET # BLD: 157 K/UL (ref 150–450)
POTASSIUM SERPL-SCNC: 4.1 MMOL/L (ref 3.5–5.1)
RBC # BLD AUTO: 3.65 M/UL (ref 4.2–5.4)
SODIUM SERPL-SCNC: 142 MMOL/L (ref 136–145)
WBC # BLD AUTO: 3.4 K/UL (ref 4.8–10.8)

## 2018-04-26 RX ADMIN — SODIUM CHLORIDE SCH MLS/HR: 0.9 INJECTION INTRAVENOUS at 14:59

## 2018-04-26 RX ADMIN — ANALGESIC BALM SCH APPLIC: 1.74; 4.06 OINTMENT TOPICAL at 09:00

## 2018-04-26 RX ADMIN — DEXTROSE MONOHYDRATE SCH MLS/HR: 50 INJECTION, SOLUTION INTRAVENOUS at 06:06

## 2018-04-26 RX ADMIN — IRBESARTAN SCH MG: 150 TABLET, FILM COATED ORAL at 09:00

## 2018-04-26 RX ADMIN — DOCUSATE SODIUM SCH MG: 100 CAPSULE, LIQUID FILLED ORAL at 17:09

## 2018-04-26 RX ADMIN — Medication SCH MG: at 09:12

## 2018-04-26 RX ADMIN — ANALGESIC BALM SCH APPLIC: 1.74; 4.06 OINTMENT TOPICAL at 13:00

## 2018-04-26 RX ADMIN — ASPIRIN SCH MG: 81 TABLET, DELAYED RELEASE ORAL at 09:11

## 2018-04-26 RX ADMIN — DOCUSATE SODIUM SCH MG: 100 CAPSULE, LIQUID FILLED ORAL at 09:12

## 2018-04-26 RX ADMIN — OXCARBAZEPINE SCH MG: 150 TABLET, FILM COATED ORAL at 09:11

## 2018-04-26 RX ADMIN — VITAMIN D, TAB 1000IU (100/BT) SCH INTLU: 25 TAB at 09:11

## 2018-04-26 RX ADMIN — DONEPEZIL HYDROCHLORIDE SCH MG: 10 TABLET, FILM COATED ORAL at 09:11

## 2018-04-26 NOTE — GENERAL PROGRESS NOTE
Assessment/Plan


Problem List:  


(1) Urinary tract infection


ICD Codes:  N39.0 - Urinary tract infection, site not specified


SNOMED:  28430745


(2) Hematuria


ICD Codes:  R31.9 - Hematuria, unspecified


SNOMED:  08339714


(3) HTN (hypertension), benign


ICD Codes:  I10 - HTN (hypertension), benign


SNOMED:  00589455


Status:  progressing


Assessment/Plan


uti is improving


abx per id


afebrile


vitals stable


atonic bladder 


self caths prn





Subjective


ROS Limited/Unobtainable:  Yes


Allergies:  


Coded Allergies:  


     CODEINE (Verified  Allergy, Mild, Itching, 12/26/14)





Objective





Last 24 Hour Vital Signs








  Date Time  Temp Pulse Resp B/P (MAP) Pulse Ox O2 Delivery O2 Flow Rate FiO2


 


4/26/18 17:21 98.1       


 


4/26/18 16:22 98.1       


 


4/26/18 15:57 98.1 82 20 98/55 99   





 98.1       


 


4/26/18 11:47 98.2 72 20 101/62 98   





 98.2       


 


4/26/18 09:12 97.7       


 


4/26/18 09:00    98/57    


 


4/26/18 09:00  74  98/57    


 


4/26/18 08:20 97.7 74 20 98/57 98   





 97.7       

















Intake and Output  


 


 4/25/18 4/26/18





 19:00 07:00


 


Intake Total 360 ml 360 ml


 


Output Total 450 ml 1500 ml


 


Balance -90 ml -1140 ml


 


  


 


Intake Oral 360 ml 360 ml


 


Output Urine Total 450 ml 1500 ml


 


# Bowel Movements 1 








Laboratory Tests


4/26/18 05:45: 


White Blood Count 3.4L, Red Blood Count 3.65L, Hemoglobin 11.1L, Hematocrit 

32.7L, Mean Corpuscular Volume 90, Mean Corpuscular Hemoglobin 30.5, Mean 

Corpuscular Hemoglobin Concent 34.0, Red Cell Distribution Width 14.2, Platelet 

Count 157, Mean Platelet Volume 9.0, Neutrophils (%) (Auto) , Lymphocytes (%) (

Auto) , Monocytes (%) (Auto) , Eosinophils (%) (Auto) , Basophils (%) (Auto) , 

Differential Total Cells Counted 100, Neutrophils % (Manual) 49, Lymphocytes % (

Manual) 42, Monocytes % (Manual) 7, Eosinophils % (Manual) 2, Basophils % (

Manual) 0, Band Neutrophils 0, Platelet Estimate Adequate, Platelet Morphology 

Normal, Anisocytosis 1+, Sodium Level 142, Potassium Level 4.1, Chloride Level 

107, Carbon Dioxide Level 25, Anion Gap 10, Blood Urea Nitrogen 14, Creatinine 

1.0, Estimat Glomerular Filtration Rate , Glucose Level 77, Calcium Level 8.9, 

Total Bilirubin 0.3, Aspartate Amino Transf (AST/SGOT) 27, Alanine 

Aminotransferase (ALT/SGPT) 19, Alkaline Phosphatase 59, Total Protein 6.5, 

Albumin 2.7L, Globulin 3.8, Albumin/Globulin Ratio 0.7L


Height (Feet):  5


Height (Inches):  2.00


Weight (Pounds):  110











Kylah Osborn MD Apr 26, 2018 21:23

## 2018-04-26 NOTE — GENERAL PROGRESS NOTE
Assessment/Plan


Status:  stable, progressing


Assessment/Plan


Encephalopathy


Dementia with behavioral disturbance





-cont risperdal prn


-cont Aricept.


-the pt was given ro





Subjective


Date patient seen:  Apr 26, 2018


Neurologic/Psychiatric:  Reports: anxiety, depressed, emotional problems


Allergies:  


Coded Allergies:  


     CODEINE (Verified  Allergy, Mild, Itching, 12/26/14)


Subjective


the pt is less congused today and more engaged.





Objective





Last 24 Hour Vital Signs








  Date Time  Temp Pulse Resp B/P (MAP) Pulse Ox O2 Delivery O2 Flow Rate FiO2


 


4/26/18 11:47 98.2 72 20 101/62 98   





 98.2       


 


4/26/18 10:11 97.7       


 


4/26/18 09:12 97.7       


 


4/26/18 09:00    98/57    


 


4/26/18 09:00  74  98/57    


 


4/26/18 08:20 97.7 74 20 98/57 98   





 97.7       


 


4/25/18 21:00 99.7 69 18 121/79 98   





 99.7       


 


4/25/18 16:00 97.6 70 19 118/64 98   





 97.6       

















Intake and Output  


 


 4/25/18 4/26/18





 19:00 07:00


 


Intake Total 360 ml 360 ml


 


Output Total 450 ml 1500 ml


 


Balance -90 ml -1140 ml


 


  


 


Intake Oral 360 ml 360 ml


 


Output Urine Total 450 ml 1500 ml


 


# Bowel Movements 1 








Laboratory Tests


4/26/18 05:45: 


White Blood Count 3.4L, Red Blood Count 3.65L, Hemoglobin 11.1L, Hematocrit 

32.7L, Mean Corpuscular Volume 90, Mean Corpuscular Hemoglobin 30.5, Mean 

Corpuscular Hemoglobin Concent 34.0, Red Cell Distribution Width 14.2, Platelet 

Count 157, Mean Platelet Volume 9.0, Neutrophils (%) (Auto) , Lymphocytes (%) (

Auto) , Monocytes (%) (Auto) , Eosinophils (%) (Auto) , Basophils (%) (Auto) , 

Differential Total Cells Counted 100, Neutrophils % (Manual) 49, Lymphocytes % (

Manual) 42, Monocytes % (Manual) 7, Eosinophils % (Manual) 2, Basophils % (

Manual) 0, Band Neutrophils 0, Platelet Estimate Adequate, Platelet Morphology 

Normal, Anisocytosis 1+, Sodium Level 142, Potassium Level 4.1, Chloride Level 

107, Carbon Dioxide Level 25, Anion Gap 10, Blood Urea Nitrogen 14, Creatinine 

1.0, Estimat Glomerular Filtration Rate , Glucose Level 77, Calcium Level 8.9, 

Total Bilirubin 0.3, Aspartate Amino Transf (AST/SGOT) 27, Alanine 

Aminotransferase (ALT/SGPT) 19, Alkaline Phosphatase 59, Total Protein 6.5, 

Albumin 2.7L, Globulin 3.8, Albumin/Globulin Ratio 0.7L


Height (Feet):  5


Height (Inches):  2.00


Weight (Pounds):  110


General Appearance:  no apparent distress, alert


Neurologic:  oriented x 3, responsive, depressed affect











Phoenix Izquierdo M.D. Apr 26, 2018 12:53

## 2018-04-26 NOTE — HISTORY AND PHYSICAL REPORT
DATE OF ADMISSION:  04/24/2018



NOTE:  POOR AUDIO



HISTORY OF PRESENT ILLNESS:  The patient admitted for complicated urinary

tract infection.  The patient has also been catheterizing herself at home

for the atonic bladder.  The patient got assessed in the ER _____.  The

patient also had some suprapubic tenderness.  Also, _____.  Denies nausea,

vomiting, or diarrhea.  Denies abdominal pain.  Does have some suprapubic

tenderness.  Denies shortness of breath.  Denies cough.



PAST MEDICAL HISTORY:  Atonic bladder, constipation, organic brain

syndrome, psychosis, hyperlipidemia, history of CVA, history of

hypertension, and _____.



PAST SURGICAL HISTORY:  Hysterectomy, bladder surgery, _____.



ALLERGIES:  Codeine.



MEDICATIONS:  Aspirin, Norvasc, benazepril, Colace, Avapro, simvastatin,

and thiamine.



FAMILY HISTORY:  Noncontributory.



SOCIAL HISTORY:  Denies history of drug or alcohol abuse.  No history of

smoking.



REVIEW OF SYSTEMS:  HEENT:  Denies headaches.  RESPIRATORY:  Denies

shortness of breath.  Denies cough.  CARDIOVASCULAR:  Denies chest pain.

_____.  NEUROLOGIC:  Denies change in vision or speech pattern.



PHYSICAL EXAMINATION:

VITAL SIGNS:  Temperature 97.9, pulse is 74, and blood pressure 149/71.

HEENT:  PERRLA.

NECK:  Supple.  No lymphadenopathy.

CHEST:  Clear to auscultation.

GASTROINTESTINAL:  Soft, nondistended.  Positive bowel sounds.  No

organomegaly.  Abdomen is soft.

EXTREMITIES:  No edema.  Reflexes equal on both sides.

NEUROLOGIC:  Lower extremity weakness.



LABORATORY DATA:  WBC of 3.5, hemoglobin of 12, and platelets 151,000.

Sodium 139, potassium 4.9, BUN of 15, creatinine of 0.7, and glucose of

85.  Albumin of 3.1.



ASSESSMENT AND PLAN:  Complicated urinary tract infection, chronic pain

syndrome.  I have asked Dr. Christie, Dr. Byrne as well as Dr. Rut Sommers to see the patient for the above-mentioned diagnoses and treatment

as well as Infectious Disease, Dr. _____ will monitor the patient

closely.









  ______________________________________________

  Kylah Osborn M.D.





DR:  Siri

D:  04/25/2018 20:57

T:  04/26/2018 08:29

JOB#:  [id number]

CC:

## 2018-04-26 NOTE — CONSULTATION
DATE OF CONSULTATION:  04/25/2018



PAIN MANAGEMENT CONSULTATION



CONSULTING PHYSICIAN:  Behnoush Zarrini, M.D.



REFERRING PHYSICIAN:  Kylah Osborn M.D.



PHYSICIAN ASSISTANT:  GUY Stone



CHIEF COMPLAINT:  Joint pain.



HISTORY OF PRESENT ILLNESS:  This is an 84-year-old female, who is being

seen on the Med/Surg floor of [facility] for initial

comprehensive pain management consultation.  The patient was admitted

under the care of Dr. Osborn.  She is complaining of urinary issues and

difficulty emptying her bladder having hematuria.  Torres in

place.  Being seen by nephrologist, having pain, started on Tylenol 650 mg

tablet one tablet every 6 hours as needed for pain.  The patient is

comfortable at this time.  We were consulted so that the patient would

have adequate pain control while here in the hospital.



PAST MEDICAL HISTORY:  Dementia, CVA, hypercholesterolemia, hypertension,

COPD, asthma, neurogenic bladder, and diabetes.



ALLERGIES:  Codeine.



SOCIAL HISTORY:  Denies smoking tobacco, drinking alcohol, and IV drug

abuse.



REVIEW OF SYSTEMS:  Denies rash, fever, chills, sweating, dizziness,

drowsiness, blurred vision, sore throat, or change in weight.  She is

complaining of joint pain.



PHYSICAL EXAMINATION:

GENERAL:  Alert, awake, and oriented.

VITAL SIGNS:  Blood pressure 118/64, heart rate 70, oxygen saturation 98%,

respiratory rate 17, and temperature is 97.6 degrees Fahrenheit.

HEENT:  PERRLA.

NECK:  Range of motion is full in all directions.  No tenderness to

paracervical muscles.  No adenopathy.

LUNGS:  Decreased breath sounds bilaterally.

HEART:  Regular.

ABDOMEN:  Benign.

BACK:  Range of motion is decreased in flexion and extension.

EXTREMITIES:  Upper and lower extremity range of motion is decreased due to

the patient's pain and condition.  No cyanosis.  No clubbing.  No edema.

Sensory is intact.   Reflexes are not obtainable.  No adenopathy.



ASSESSMENT AND PLAN:  This is an 84-year-old female with multiple joint

osteoarthritis, multiple joint pain.  The patient will be continued on Tylenol 
as

needed and will be started on Bengay to be applied one application 4 times

a day.  The patient was discussed with Dr. Christie and Dr. Christie

concurred.  We will follow the patient.



Thank you very much for the courtesy of this consultation.







  ______________________________________________

  Behnoush Zarrini, M.D.





  ______________________________________________

  ROGER Stone





DR:  APOLINAR

D:  04/25/2018 19:14

T:  04/26/2018 03:43

JOB#:  [id number]

CC:



LEX

## 2018-04-26 NOTE — CONSULTATION
History of Present Illness


General


Date patient seen:  Apr 25, 2018


Chief Complaint:  Abdominal Pain


Referring physician:  Irena


Reason for Consultation:  Urinary retention





Present Illness


HPI


84-year-old female who presents with bilateral groin area pain and hematuria. 

The pt has hx of dementia and agitation. The pt was c/o abdominal pain. The pt 

was confused and was unable to have rational conversation. No agitation.The pt 

was admitted on risperdal and aricept. the pt has impairment of cognition.


Allergies:  


Coded Allergies:  


     CODEINE (Verified  Allergy, Mild, Itching, 12/26/14)





Medication History


Scheduled


Amlodipine Besylate (Norvasc), 5 MG ORAL DAILY


Aspirin (Ecotrin), 81 MG PO DAILY, (Reported)


Aspirin* (Aspir 81*), 81 MG ORAL DAILY, (Reported)


Docusate Sodium (Dok), 100 MG PO BID, (Reported)


Donepezil Hcl* (Donepezil Hcl*), 10 MG ORAL DAILY, (Reported)


Gabapentin* (Gabapentin*), 100 MG ORAL BID, (Reported)


Irbesartan* (Avapro*), 150 MG ORAL DAILY


Levofloxacin (Levofloxacin*), 500 MG ORAL DAILY, (Reported)


Megestrol Acetate (Megestrol Acetate), 40 MG PO BID, (Reported)


Nitrofurantoin Macrocrystal (Nitrofurantoin), 100 MG PO BID, (Reported)


Omega-3 Fatty Acids/Fish Oil (Fish Oil 1,000 Mg Capsule), 1 CAP ORAL DAILY, (

Reported)


Oxcarbazepine (Oxcarbazepine), 150 MG PO DAILY, (Reported)


Risperidone* (Risperdal*), 0.5 MG ORAL Q12HR


Simvastatin (Zocor), 20 MG ORAL DAILY, (Reported)


Thiamine Hcl (Vitamin B1*), 100 MG ORAL DAILY


Vitamin D (Vitamin D3), 1,000 UNITS ORAL DAILY, (Reported)





Scheduled PRN


Melatonin (Melatonin), 3 MG ORAL BEDTIME PRN for Insomnia, (Reported)





Miscellaneous Medications


[Simvastatin], (Reported)


[klonopin], (Reported)





Patient History


Limited by:  medical condition


History Provided By:  Patient, Medical Record, PMD


Healthcare decision maker


N


Resuscitation status





Advanced Directive on File








Past Medical/Surgical History


Past Medical/Surgical History:  


(1) Encephalopathy, hypertensive


(2) HTN (hypertension), benign


(3) Hematuria


(4) Urinary tract infection


(5) Stroke


(6) Vascular dementia with delirium


(7) Arterial ischemic stroke, multifocal, multiple vascular territories, chronic





Review of Systems


Psychiatric:  Reports: prior hx, anxiety, depressed feelings, emotional problems





Physical Exam


General Appearance:  WD/WN, no apparent distress, alert, confused


Neurologic:  depressed affect





Last 24 Hour Vital Signs








  Date Time  Temp Pulse Resp B/P (MAP) Pulse Ox O2 Delivery O2 Flow Rate FiO2


 


4/26/18 11:47 98.2 72 20 101/62 98   





 98.2       


 


4/26/18 10:11 97.7       


 


4/26/18 09:12 97.7       


 


4/26/18 09:00    98/57    


 


4/26/18 09:00  74  98/57    


 


4/26/18 08:20 97.7 74 20 98/57 98   





 97.7       


 


4/25/18 21:00 99.7 69 18 121/79 98   





 99.7       


 


4/25/18 16:00 97.6 70 19 118/64 98   





 97.6       

















Intake and Output  


 


 4/25/18 4/26/18





 19:00 07:00


 


Intake Total 360 ml 360 ml


 


Output Total 450 ml 1500 ml


 


Balance -90 ml -1140 ml


 


  


 


Intake Oral 360 ml 360 ml


 


Output Urine Total 450 ml 1500 ml


 


# Bowel Movements 1 











Laboratory Tests








Test


  4/26/18


05:45


 


White Blood Count


  3.4 K/UL


(4.8-10.8)  L


 


Red Blood Count


  3.65 M/UL


(4.20-5.40)  L


 


Hemoglobin


  11.1 G/DL


(12.0-16.0)  L


 


Hematocrit


  32.7 %


(37.0-47.0)  L


 


Mean Corpuscular Volume 90 FL (80-99)  


 


Mean Corpuscular Hemoglobin


  30.5 PG


(27.0-31.0)


 


Mean Corpuscular Hemoglobin


Concent 34.0 G/DL


(32.0-36.0)


 


Red Cell Distribution Width


  14.2 %


(11.6-14.8)


 


Platelet Count


  157 K/UL


(150-450)


 


Mean Platelet Volume


  9.0 FL


(6.5-10.1)


 


Neutrophils (%) (Auto)


  % (45.0-75.0)


 


 


Lymphocytes (%) (Auto)


  % (20.0-45.0)


 


 


Monocytes (%) (Auto)  % (1.0-10.0)  


 


Eosinophils (%) (Auto)  % (0.0-3.0)  


 


Basophils (%) (Auto)  % (0.0-2.0)  


 


Differential Total Cells


Counted 100  


 


 


Neutrophils % (Manual) 49 % (45-75)  


 


Lymphocytes % (Manual) 42 % (20-45)  


 


Monocytes % (Manual) 7 % (1-10)  


 


Eosinophils % (Manual) 2 % (0-3)  


 


Basophils % (Manual) 0 % (0-2)  


 


Band Neutrophils 0 % (0-8)  


 


Platelet Estimate Adequate  


 


Platelet Morphology Normal  


 


Anisocytosis 1+  


 


Sodium Level


  142 MMOL/L


(136-145)


 


Potassium Level


  4.1 MMOL/L


(3.5-5.1)


 


Chloride Level


  107 MMOL/L


()


 


Carbon Dioxide Level


  25 MMOL/L


(21-32)


 


Anion Gap


  10 mmol/L


(5-15)


 


Blood Urea Nitrogen


  14 mg/dL


(7-18)


 


Creatinine


  1.0 MG/DL


(0.55-1.30)


 


Estimat Glomerular Filtration


Rate  mL/min (>60)  


 


 


Glucose Level


  77 MG/DL


()


 


Calcium Level


  8.9 MG/DL


(8.5-10.1)


 


Total Bilirubin


  0.3 MG/DL


(0.2-1.0)


 


Aspartate Amino Transf


(AST/SGOT) 27 U/L (15-37)


 


 


Alanine Aminotransferase


(ALT/SGPT) 19 U/L (12-78)


 


 


Alkaline Phosphatase


  59 U/L


()


 


Total Protein


  6.5 G/DL


(6.4-8.2)


 


Albumin


  2.7 G/DL


(3.4-5.0)  L


 


Globulin 3.8 g/dL  


 


Albumin/Globulin Ratio


  0.7 (1.0-2.7)


L








Height (Feet):  5


Height (Inches):  2.00


Weight (Pounds):  110


Medications





Current Medications








 Medications


  (Trade)  Dose


 Ordered  Sig/Carl


 Route


 PRN Reason  Start Time


 Stop Time Status Last Admin


Dose Admin


 


 Acetaminophen


  (Tylenol)  650 mg  QIDPRN  PRN


 ORAL


 Mild Pain/Temp > 100.5  4/25/18 09:15


 5/25/18 09:14  4/26/18 09:12


 


 


 Amlodipine


 Besylate


  (Norvasc)  5 mg  DAILY


 ORAL


   4/25/18 09:00


 5/25/18 08:59  4/25/18 08:19


 


 


 Aspirin


  (Ecotrin)  81 mg  DAILY


 ORAL


   4/25/18 09:00


 5/25/18 08:59  4/26/18 09:11


 


 


 Atorvastatin


 Calcium


  (Lipitor)  10 mg  QHS


 ORAL


   4/24/18 21:00


 5/24/18 20:59  4/25/18 21:55


 


 


 Docusate Sodium


  (Colace)  100 mg  BID


 ORAL


   4/24/18 18:00


 5/24/18 17:59  4/26/18 09:12


 


 


 Donepezil HCl


  (Aricept)  10 mg  DAILY


 ORAL


   4/25/18 09:00


 5/25/18 08:59  4/26/18 09:11


 


 


 Fish Oil


  (Fish Oil)  1,000 mg  DAILY


 ORAL


   4/25/18 09:00


 5/25/18 08:59  4/26/18 09:12


 


 


 Gabapentin


  (Neurontin)  100 mg  BID


 ORAL


   4/24/18 18:00


 5/24/18 17:59  4/26/18 09:12


 


 


 Irbesartan


  (Avapro)  150 mg  DAILY


 ORAL


   4/25/18 09:00


 5/25/18 08:59  4/25/18 08:18


 


 


 Megestrol Acetate


  (Megace)  40 mg  TWICE A  DAY


 ORAL


   4/24/18 18:00


 5/24/18 17:59  4/26/18 09:12


 


 


 Menthol/Methyl


 Salicylate


  (Bengay)  1 applic  FOUR TIMES A  DAY


 TOPIC


   4/25/18 21:00


 5/25/18 20:59   


 


 


 Oxcarbazepine


  (Trileptal)  150 mg  DAILY


 ORAL


   4/25/18 09:00


 5/25/18 08:59  4/26/18 09:11


 


 


 Piperacillin Sod/


 Tazobactam Sod


 3.375 gm/Dextrose  110 ml @ 


 27.5 mls/hr  EVERY 8  HOURS


 IVPB


   4/25/18 14:00


 4/30/18 13:59  4/26/18 06:06


 


 


 Risperidone


  (RisperDAL)  0.5 mg  Q12H  PRN


 ORAL


 Agitation  4/24/18 17:00


 5/24/18 16:59   


 


 


 Thiamine HCl


  (Vitamin B1)  100 mg  DAILY


 ORAL


   4/25/18 09:00


 5/25/18 08:59  4/26/18 09:12


 


 


 Vitamin D


  (Vitamin D)  1,000 intlu  DAILY


 ORAL


   4/25/18 09:00


 5/25/18 08:59  4/26/18 09:11


 











Assessment/Plan


Status:  stable


Assessment/Plan


Encephalopathy


Dementia with behavioral disturbance





-cont risperdal prn


-cont Aricept.


-the pt was given Phoenix Ybarra M.D. Apr 26, 2018 12:50

## 2018-04-26 NOTE — INFECTIOUS DISEASES PROG NOTE
Assessment/Plan


Assessment/Plan


A;


E. coli UTI


bladder prolapse


Cirrhosis


Osteoporosis 


compression fracture


dementia


P;


Change Zosyn to Rocephin





Subjective


ROS Limited/Unobtainable:  No


Cardiovascular:  Reports: no symptoms


Gastrointestinal/Abdominal:  Reports: no symptoms


Musculoskeletal:  Reports: pain, other - on kness, thigh


Allergies:  


Coded Allergies:  


     CODEINE (Verified  Allergy, Mild, Itching, 12/26/14)





Objective


Vital Signs





Last 24 Hour Vital Signs








  Date Time  Temp Pulse Resp B/P (MAP) Pulse Ox O2 Delivery O2 Flow Rate FiO2


 


4/26/18 11:47 98.2 72 20 101/62 98   





 98.2       


 


4/26/18 10:11 97.7       


 


4/26/18 09:12 97.7       


 


4/26/18 09:00    98/57    


 


4/26/18 09:00  74  98/57    


 


4/26/18 08:20 97.7 74 20 98/57 98   





 97.7       


 


4/25/18 21:00 99.7 69 18 121/79 98   





 99.7       


 


4/25/18 16:00 97.6 70 19 118/64 98   





 97.6       








Height (Feet):  5


Height (Inches):  2.00


Weight (Pounds):  110


HEENT:  mucous membranes moist


Respiratory/Chest:  lungs clear


Cardiovascular:  normal rate


Abdomen:  soft, non tender


Genitourinary:  other - Torres catheter, hematuria decreased


Extremities:  no edema


Neurologic/Psychiatric:  alert, responsive





Microbiology








 Date/Time


Source Procedure


Growth Status


 


 


 4/24/18 10:39


Urine,Clean Catch Urine Culture - Final


Escherichia Coli


Mixed Gram Positive Organism Complete











Laboratory Tests








Test


  4/26/18


05:45


 


White Blood Count


  3.4 K/UL


(4.8-10.8)  L


 


Red Blood Count


  3.65 M/UL


(4.20-5.40)  L


 


Hemoglobin


  11.1 G/DL


(12.0-16.0)  L


 


Hematocrit


  32.7 %


(37.0-47.0)  L


 


Mean Corpuscular Volume 90 FL (80-99)  


 


Mean Corpuscular Hemoglobin


  30.5 PG


(27.0-31.0)


 


Mean Corpuscular Hemoglobin


Concent 34.0 G/DL


(32.0-36.0)


 


Red Cell Distribution Width


  14.2 %


(11.6-14.8)


 


Platelet Count


  157 K/UL


(150-450)


 


Mean Platelet Volume


  9.0 FL


(6.5-10.1)


 


Neutrophils (%) (Auto)


  % (45.0-75.0)


 


 


Lymphocytes (%) (Auto)


  % (20.0-45.0)


 


 


Monocytes (%) (Auto)  % (1.0-10.0)  


 


Eosinophils (%) (Auto)  % (0.0-3.0)  


 


Basophils (%) (Auto)  % (0.0-2.0)  


 


Differential Total Cells


Counted 100  


 


 


Neutrophils % (Manual) 49 % (45-75)  


 


Lymphocytes % (Manual) 42 % (20-45)  


 


Monocytes % (Manual) 7 % (1-10)  


 


Eosinophils % (Manual) 2 % (0-3)  


 


Basophils % (Manual) 0 % (0-2)  


 


Band Neutrophils 0 % (0-8)  


 


Platelet Estimate Adequate  


 


Platelet Morphology Normal  


 


Anisocytosis 1+  


 


Sodium Level


  142 MMOL/L


(136-145)


 


Potassium Level


  4.1 MMOL/L


(3.5-5.1)


 


Chloride Level


  107 MMOL/L


()


 


Carbon Dioxide Level


  25 MMOL/L


(21-32)


 


Anion Gap


  10 mmol/L


(5-15)


 


Blood Urea Nitrogen


  14 mg/dL


(7-18)


 


Creatinine


  1.0 MG/DL


(0.55-1.30)


 


Estimat Glomerular Filtration


Rate  mL/min (>60)  


 


 


Glucose Level


  77 MG/DL


()


 


Calcium Level


  8.9 MG/DL


(8.5-10.1)


 


Total Bilirubin


  0.3 MG/DL


(0.2-1.0)


 


Aspartate Amino Transf


(AST/SGOT) 27 U/L (15-37)


 


 


Alanine Aminotransferase


(ALT/SGPT) 19 U/L (12-78)


 


 


Alkaline Phosphatase


  59 U/L


()


 


Total Protein


  6.5 G/DL


(6.4-8.2)


 


Albumin


  2.7 G/DL


(3.4-5.0)  L


 


Globulin 3.8 g/dL  


 


Albumin/Globulin Ratio


  0.7 (1.0-2.7)


L











Current Medications








 Medications


  (Trade)  Dose


 Ordered  Sig/Carl


 Route


 PRN Reason  Start Time


 Stop Time Status Last Admin


Dose Admin


 


 Acetaminophen


  (Tylenol)  650 mg  QIDPRN  PRN


 ORAL


 Mild Pain/Temp > 100.5  4/25/18 09:15


 5/25/18 09:14  4/26/18 09:12


 


 


 Amlodipine


 Besylate


  (Norvasc)  5 mg  DAILY


 ORAL


   4/25/18 09:00


 5/25/18 08:59  4/25/18 08:19


 


 


 Aspirin


  (Ecotrin)  81 mg  DAILY


 ORAL


   4/25/18 09:00


 5/25/18 08:59  4/26/18 09:11


 


 


 Atorvastatin


 Calcium


  (Lipitor)  10 mg  QHS


 ORAL


   4/24/18 21:00


 5/24/18 20:59  4/25/18 21:55


 


 


 Docusate Sodium


  (Colace)  100 mg  BID


 ORAL


   4/24/18 18:00


 5/24/18 17:59  4/26/18 09:12


 


 


 Donepezil HCl


  (Aricept)  10 mg  DAILY


 ORAL


   4/25/18 09:00


 5/25/18 08:59  4/26/18 09:11


 


 


 Fish Oil


  (Fish Oil)  1,000 mg  DAILY


 ORAL


   4/25/18 09:00


 5/25/18 08:59  4/26/18 09:12


 


 


 Gabapentin


  (Neurontin)  100 mg  BID


 ORAL


   4/24/18 18:00


 5/24/18 17:59  4/26/18 09:12


 


 


 Irbesartan


  (Avapro)  150 mg  DAILY


 ORAL


   4/25/18 09:00


 5/25/18 08:59  4/25/18 08:18


 


 


 Megestrol Acetate


  (Megace)  40 mg  TWICE A  DAY


 ORAL


   4/24/18 18:00


 5/24/18 17:59  4/26/18 09:12


 


 


 Menthol/Methyl


 Salicylate


  (Bengay)  1 applic  FOUR TIMES A  DAY


 TOPIC


   4/25/18 21:00


 5/25/18 20:59   


 


 


 Oxcarbazepine


  (Trileptal)  150 mg  DAILY


 ORAL


   4/25/18 09:00


 5/25/18 08:59  4/26/18 09:11


 


 


 Piperacillin Sod/


 Tazobactam Sod


 3.375 gm/Dextrose  110 ml @ 


 27.5 mls/hr  EVERY 8  HOURS


 IVPB


   4/25/18 14:00


 4/30/18 13:59  4/26/18 06:06


 


 


 Risperidone


  (RisperDAL)  0.5 mg  Q12H  PRN


 ORAL


 Agitation  4/24/18 17:00


 5/24/18 16:59   


 


 


 Thiamine HCl


  (Vitamin B1)  100 mg  DAILY


 ORAL


   4/25/18 09:00


 5/25/18 08:59  4/26/18 09:12


 


 


 Vitamin D


  (Vitamin D)  1,000 intlu  DAILY


 ORAL


   4/25/18 09:00


 5/25/18 08:59  4/26/18 09:11


 

















MACIE THORNE Apr 26, 2018 13:02

## 2018-04-26 NOTE — NEPHROLOGY PROGRESS NOTE
Assessment/Plan


Problem List:  


(1) Hematuria


(2) Urinary tract infection


(3) HTN (hypertension), benign


Assessment


UTI


Bladder Prolapse


Psych Ds


Plan





per ID and Uro





Subjective


ROS Limited/Unobtainable:  No


Constitutional:  Reports: malaise





Objective


Objective





Last 24 Hour Vital Signs








  Date Time  Temp Pulse Resp B/P (MAP) Pulse Ox O2 Delivery O2 Flow Rate FiO2


 


4/26/18 11:47 98.2 72 20 101/62 98   





 98.2       


 


4/26/18 10:11 97.7       


 


4/26/18 09:12 97.7       


 


4/26/18 09:00    98/57    


 


4/26/18 09:00  74  98/57    


 


4/26/18 08:20 97.7 74 20 98/57 98   





 97.7       


 


4/25/18 21:00 99.7 69 18 121/79 98   





 99.7       


 


4/25/18 16:00 97.6 70 19 118/64 98   





 97.6       

















Intake and Output  


 


 4/25/18 4/26/18





 19:00 07:00


 


Intake Total 360 ml 360 ml


 


Output Total 450 ml 1500 ml


 


Balance -90 ml -1140 ml


 


  


 


Intake Oral 360 ml 360 ml


 


Output Urine Total 450 ml 1500 ml


 


# Bowel Movements 1 








Laboratory Tests


4/26/18 05:45: 


White Blood Count 3.4L, Red Blood Count 3.65L, Hemoglobin 11.1L, Hematocrit 

32.7L, Mean Corpuscular Volume 90, Mean Corpuscular Hemoglobin 30.5, Mean 

Corpuscular Hemoglobin Concent 34.0, Red Cell Distribution Width 14.2, Platelet 

Count 157, Mean Platelet Volume 9.0, Neutrophils (%) (Auto) , Lymphocytes (%) (

Auto) , Monocytes (%) (Auto) , Eosinophils (%) (Auto) , Basophils (%) (Auto) , 

Differential Total Cells Counted 100, Neutrophils % (Manual) 49, Lymphocytes % (

Manual) 42, Monocytes % (Manual) 7, Eosinophils % (Manual) 2, Basophils % (

Manual) 0, Band Neutrophils 0, Platelet Estimate Adequate, Platelet Morphology 

Normal, Anisocytosis 1+, Sodium Level 142, Potassium Level 4.1, Chloride Level 

107, Carbon Dioxide Level 25, Anion Gap 10, Blood Urea Nitrogen 14, Creatinine 

1.0, Estimat Glomerular Filtration Rate , Glucose Level 77, Calcium Level 8.9, 

Total Bilirubin 0.3, Aspartate Amino Transf (AST/SGOT) 27, Alanine 

Aminotransferase (ALT/SGPT) 19, Alkaline Phosphatase 59, Total Protein 6.5, 

Albumin 2.7L, Globulin 3.8, Albumin/Globulin Ratio 0.7L


Height (Feet):  5


Height (Inches):  2.00


Weight (Pounds):  110


General Appearance:  no apparent distress


Cardiovascular:  normal rate


Respiratory/Chest:  lungs clear


Abdomen:  soft











NO WANG Apr 26, 2018 13:55

## 2018-04-26 NOTE — GENERAL PROGRESS NOTE
Assessment/Plan


Assessment/Plan


(1) Multiple Joint OA


(2) Multiple Joint Pain


She will be continued on Bengay and Tylenol as needed


D/w Dr. Christie he concurred.





Subjective


Date patient seen:  Apr 26, 2018


Time patient seen:  07:00 - am


Allergies:  


Coded Allergies:  


     CODEINE (Verified  Allergy, Mild, Itching, 12/26/14)


Subjective


REVIEW OF SYSTEMS:  Denies rash, fever, chills, sweating, dizziness,


drowsiness, blurred vision, sore throat, or change in weight.  She is


complaining of joint pain.





SUBJECTIVE: Patient is in bed reporting no pain at this time. She is


comfortable and shows no signs of distress.





Objective





Last 24 Hour Vital Signs








  Date Time  Temp Pulse Resp B/P (MAP) Pulse Ox O2 Delivery O2 Flow Rate FiO2


 


4/26/18 08:20 97.7 74 20 98/57 98   





 97.7       


 


4/25/18 21:00 99.7 69 18 121/79 98   





 99.7       


 


4/25/18 16:00 97.6 70 19 118/64 98   





 97.6       


 


4/25/18 12:00 99.5 72 20 109/57 97   





 99.5       

















Intake and Output  


 


 4/25/18 4/26/18





 19:00 07:00


 


Intake Total 360 ml 360 ml


 


Output Total 450 ml 1500 ml


 


Balance -90 ml -1140 ml


 


  


 


Intake Oral 360 ml 360 ml


 


Output Urine Total 450 ml 1500 ml


 


# Bowel Movements 1 








Laboratory Tests


4/25/18 10:50: 


White Blood Count 3.9L, Red Blood Count 3.86L, Hemoglobin 12.0, Hematocrit 35.1L

, Mean Corpuscular Volume 91, Mean Corpuscular Hemoglobin 31.0, Mean 

Corpuscular Hemoglobin Concent 34.1, Red Cell Distribution Width 14.3, Platelet 

Count 166, Mean Platelet Volume 9.5, Neutrophils (%) (Auto) 57.8, Lymphocytes (%

) (Auto) 30.5, Monocytes (%) (Auto) 8.8, Eosinophils (%) (Auto) 1.1, Basophils (

%) (Auto) 1.8, Sodium Level 140, Potassium Level 3.8, Chloride Level 104, 

Carbon Dioxide Level 27, Anion Gap 9, Blood Urea Nitrogen 12, Creatinine 0.8, 

Estimat Glomerular Filtration Rate , Glucose Level 77, Hemoglobin A1c 5.3, Uric 

Acid 4.4, Calcium Level 9.4, Phosphorus Level 3.9, Magnesium Level 1.9, Total 

Bilirubin 0.4, Aspartate Amino Transf (AST/SGOT) 27, Alanine Aminotransferase (

ALT/SGPT) 23, Alkaline Phosphatase 67, Pro-B-Type Natriuretic Peptide 109, 

Total Protein 7.4, Albumin 3.2L, Globulin 4.2, Albumin/Globulin Ratio 0.8L, 

Thyroid Stimulating Hormone (TSH) 1.838


4/26/18 05:45: 


White Blood Count 3.4L, Red Blood Count 3.65L, Hemoglobin 11.1L, Hematocrit 

32.7L, Mean Corpuscular Volume 90, Mean Corpuscular Hemoglobin 30.5, Mean 

Corpuscular Hemoglobin Concent 34.0, Red Cell Distribution Width 14.2, Platelet 

Count 157, Mean Platelet Volume 9.0, Neutrophils (%) (Auto) , Lymphocytes (%) (

Auto) , Monocytes (%) (Auto) , Eosinophils (%) (Auto) , Basophils (%) (Auto) , 

Sodium Level 142, Potassium Level 4.1, Chloride Level 107, Carbon Dioxide Level 

25, Anion Gap 10, Blood Urea Nitrogen 14, Creatinine 1.0, Estimat Glomerular 

Filtration Rate , Glucose Level 77, Calcium Level 8.9, Total Bilirubin 0.3, 

Aspartate Amino Transf (AST/SGOT) 27, Alanine Aminotransferase (ALT/SGPT) 19, 

Alkaline Phosphatase 59, Total Protein 6.5, Albumin 2.7L, Globulin 3.8, Albumin/

Globulin Ratio 0.7L, Neutrophils % (Manual) [Pending], Lymphocytes % (Manual) [

Pending], Platelet Estimate [Pending], Platelet Morphology [Pending]


Height (Feet):  5


Height (Inches):  2.00


Weight (Pounds):  110


Objective


GENERAL:  Alert, awake, and oriented.


HEENT:  PERRLA.


NECK:  Range of motion is full in all directions.  No tenderness to


paracervical muscles.  No adenopathy.


LUNGS:  Decreased breath sounds bilaterally.


HEART:  Regular.


ABDOMEN:  Benign.


BACK:  Range of motion is decreased in flexion and extension.


EXTREMITIES:  Upper and lower extremity range of motion is decreased due to


the patient's pain and condition.  No cyanosis.  No clubbing.  No edema.


Sensory is intact.   Reflexes are not obtainable.  No adenopathy.











KVNG DOMÍNGUEZ Apr 26, 2018 08:58

## 2018-04-27 VITALS — DIASTOLIC BLOOD PRESSURE: 58 MMHG | SYSTOLIC BLOOD PRESSURE: 100 MMHG

## 2018-04-27 VITALS — SYSTOLIC BLOOD PRESSURE: 100 MMHG | DIASTOLIC BLOOD PRESSURE: 57 MMHG

## 2018-04-27 VITALS — SYSTOLIC BLOOD PRESSURE: 116 MMHG | DIASTOLIC BLOOD PRESSURE: 63 MMHG

## 2018-04-27 VITALS — DIASTOLIC BLOOD PRESSURE: 62 MMHG | SYSTOLIC BLOOD PRESSURE: 107 MMHG

## 2018-04-27 VITALS — DIASTOLIC BLOOD PRESSURE: 56 MMHG | SYSTOLIC BLOOD PRESSURE: 105 MMHG

## 2018-04-27 VITALS — DIASTOLIC BLOOD PRESSURE: 55 MMHG | SYSTOLIC BLOOD PRESSURE: 102 MMHG

## 2018-04-27 LAB
ADD MANUAL DIFF: YES
ALBUMIN SERPL-MCNC: 2.9 G/DL (ref 3.4–5)
ALBUMIN/GLOB SERPL: 0.7 {RATIO} (ref 1–2.7)
ALP SERPL-CCNC: 81 U/L (ref 46–116)
ALT SERPL-CCNC: 19 U/L (ref 12–78)
ANION GAP SERPL CALC-SCNC: 6 MMOL/L (ref 5–15)
AST SERPL-CCNC: 27 U/L (ref 15–37)
BILIRUB SERPL-MCNC: 0.2 MG/DL (ref 0.2–1)
BUN SERPL-MCNC: 20 MG/DL (ref 7–18)
CALCIUM SERPL-MCNC: 9 MG/DL (ref 8.5–10.1)
CHLORIDE SERPL-SCNC: 107 MMOL/L (ref 98–107)
CO2 SERPL-SCNC: 29 MMOL/L (ref 21–32)
CREAT SERPL-MCNC: 1 MG/DL (ref 0.55–1.3)
ERYTHROCYTE [DISTWIDTH] IN BLOOD BY AUTOMATED COUNT: 14.7 % (ref 11.6–14.8)
GLOBULIN SER-MCNC: 4.2 G/DL
HCT VFR BLD CALC: 35.1 % (ref 37–47)
HGB BLD-MCNC: 11.6 G/DL (ref 12–16)
MCV RBC AUTO: 91 FL (ref 80–99)
PLATELET # BLD: 170 K/UL (ref 150–450)
POTASSIUM SERPL-SCNC: 3.6 MMOL/L (ref 3.5–5.1)
RBC # BLD AUTO: 3.86 M/UL (ref 4.2–5.4)
SODIUM SERPL-SCNC: 142 MMOL/L (ref 136–145)
WBC # BLD AUTO: 3.4 K/UL (ref 4.8–10.8)

## 2018-04-27 RX ADMIN — IRBESARTAN SCH MG: 150 TABLET, FILM COATED ORAL at 08:28

## 2018-04-27 RX ADMIN — ANALGESIC BALM SCH APPLIC: 1.74; 4.06 OINTMENT TOPICAL at 17:11

## 2018-04-27 RX ADMIN — ANALGESIC BALM SCH APPLIC: 1.74; 4.06 OINTMENT TOPICAL at 13:50

## 2018-04-27 RX ADMIN — ANALGESIC BALM SCH APPLIC: 1.74; 4.06 OINTMENT TOPICAL at 09:05

## 2018-04-27 RX ADMIN — Medication SCH MG: at 08:26

## 2018-04-27 RX ADMIN — DOCUSATE SODIUM SCH MG: 100 CAPSULE, LIQUID FILLED ORAL at 17:11

## 2018-04-27 RX ADMIN — ANALGESIC BALM SCH APPLIC: 1.74; 4.06 OINTMENT TOPICAL at 20:09

## 2018-04-27 RX ADMIN — OXCARBAZEPINE SCH MG: 150 TABLET, FILM COATED ORAL at 08:26

## 2018-04-27 RX ADMIN — ASPIRIN SCH MG: 81 TABLET, DELAYED RELEASE ORAL at 08:26

## 2018-04-27 RX ADMIN — VITAMIN D, TAB 1000IU (100/BT) SCH INTLU: 25 TAB at 08:27

## 2018-04-27 RX ADMIN — DOCUSATE SODIUM SCH MG: 100 CAPSULE, LIQUID FILLED ORAL at 08:26

## 2018-04-27 RX ADMIN — DONEPEZIL HYDROCHLORIDE SCH MG: 10 TABLET, FILM COATED ORAL at 08:26

## 2018-04-27 RX ADMIN — SODIUM CHLORIDE SCH MLS/HR: 0.9 INJECTION INTRAVENOUS at 13:50

## 2018-04-27 NOTE — GENERAL PROGRESS NOTE
Assessment/Plan


Problem List:  


(1) Urinary tract infection


ICD Codes:  N39.0 - Urinary tract infection, site not specified


SNOMED:  94492623


(2) Hematuria


ICD Codes:  R31.9 - Hematuria, unspecified


SNOMED:  31059976


(3) HTN (hypertension), benign


ICD Codes:  I10 - HTN (hypertension), benign


SNOMED:  25548352


Status:  progressing


Assessment/Plan


uti is improving


afebrile


hematurea improving


atonic bladder





Subjective


ROS Limited/Unobtainable:  Yes


Allergies:  


Coded Allergies:  


     CODEINE (Verified  Allergy, Mild, Itching, 12/26/14)





Objective





Last 24 Hour Vital Signs








  Date Time  Temp Pulse Resp B/P (MAP) Pulse Ox O2 Delivery O2 Flow Rate FiO2


 


4/27/18 20:04 99.0 89 20 107/62 97 Room Air  





 99.0 84      


 


4/27/18 16:00 97.7 73 19 102/55 97   





 97.7       


 


4/27/18 12:00 97.7 79 20 100/58 98 Room Air  





 97.7 79      


 


4/27/18 08:29  70  100/59    


 


4/27/18 08:28    100/59    


 


4/27/18 08:00 97.9 70 20 100/57 98   





 97.9       


 


4/27/18 04:30     98 Room Air  


 


4/27/18 04:29 97.2 78 20 116/63 98 Room Air  





 97.2 78      


 


4/27/18 00:42     96 Room Air  


 


4/27/18 00:29 98.2 77 20 105/56 96 Room Air  





 98.2 77      


 


4/26/18 21:00     96 Room Air  


 


4/26/18 21:00 98.2 95 20 158/85 96 Room Air  





 98.2 95      

















Intake and Output  


 


 4/26/18 4/27/18





 19:00 07:00


 


Intake Total 720 ml 


 


Output Total 400 ml 


 


Balance 320 ml 


 


  


 


Intake Oral 720 ml 


 


Output Urine Total 400 ml 








Laboratory Tests


4/27/18 06:10: 


White Blood Count 3.4L, Red Blood Count 3.86L, Hemoglobin 11.6L, Hematocrit 

35.1L, Mean Corpuscular Volume 91, Mean Corpuscular Hemoglobin 30.0, Mean 

Corpuscular Hemoglobin Concent 33.0, Red Cell Distribution Width 14.7, Platelet 

Count 170, Mean Platelet Volume 8.2, Neutrophils (%) (Auto) , Lymphocytes (%) (

Auto) , Monocytes (%) (Auto) , Eosinophils (%) (Auto) , Basophils (%) (Auto) , 

Differential Total Cells Counted 100, Neutrophils % (Manual) 52, Lymphocytes % (

Manual) 43, Monocytes % (Manual) 2, Eosinophils % (Manual) 3, Basophils % (

Manual) 0, Band Neutrophils 0, Platelet Estimate Adequate, Platelet Morphology 

Normal, Anisocytosis 1+, Sodium Level 142, Potassium Level 3.6, Chloride Level 

107, Carbon Dioxide Level 29, Anion Gap 6, Blood Urea Nitrogen 20H, Creatinine 

1.0, Estimat Glomerular Filtration Rate , Glucose Level 59L, Calcium Level 9.0, 

Total Bilirubin 0.2, Aspartate Amino Transf (AST/SGOT) 27, Alanine 

Aminotransferase (ALT/SGPT) 19, Alkaline Phosphatase 81, Total Protein 7.1, 

Albumin 2.9L, Globulin 4.2, Albumin/Globulin Ratio 0.7L


Height (Feet):  5


Height (Inches):  2.00


Weight (Pounds):  110











Kylah Osborn MD Apr 27, 2018 20:43

## 2018-04-27 NOTE — GENERAL PROGRESS NOTE
Assessment/Plan


Status:  stable, progressing


Assessment/Plan


Encephalopathy


Dementia with behavioral disturbance





-cont Risperdal prn


-cont Aricept.


-the pt was given ro





Subjective


Date patient seen:  Apr 27, 2018


Neurologic/Psychiatric:  Reports: anxiety


Allergies:  


Coded Allergies:  


     CODEINE (Verified  Allergy, Mild, Itching, 12/26/14)


Subjective


the pt is doing much better. No anxiety.





Objective





Last 24 Hour Vital Signs








  Date Time  Temp Pulse Resp B/P (MAP) Pulse Ox O2 Delivery O2 Flow Rate FiO2


 


4/27/18 12:00 97.7 79 20 100/58 98 Room Air  





 97.7 79      


 


4/27/18 08:29  70  100/59    


 


4/27/18 08:28    100/59    


 


4/27/18 08:00 97.9 70 20 100/57 98   





 97.9       


 


4/27/18 04:30     98 Room Air  


 


4/27/18 04:29 97.2 78 20 116/63 98 Room Air  





 97.2 78      


 


4/27/18 00:42     96 Room Air  


 


4/27/18 00:29 98.2 77 20 105/56 96 Room Air  





 98.2 77      


 


4/26/18 21:00     96 Room Air  


 


4/26/18 21:00 98.2 95 20 158/85 96 Room Air  





 98.2 95      


 


4/26/18 17:21 98.1       


 


4/26/18 16:22 98.1       


 


4/26/18 15:57 98.1 82 20 98/55 99   





 98.1       

















Intake and Output  


 


 4/26/18 4/27/18





 19:00 07:00


 


Intake Total 720 ml 


 


Output Total 400 ml 


 


Balance 320 ml 


 


  


 


Intake Oral 720 ml 


 


Output Urine Total 400 ml 








Laboratory Tests


4/27/18 06:10: 


White Blood Count 3.4L, Red Blood Count 3.86L, Hemoglobin 11.6L, Hematocrit 

35.1L, Mean Corpuscular Volume 91, Mean Corpuscular Hemoglobin 30.0, Mean 

Corpuscular Hemoglobin Concent 33.0, Red Cell Distribution Width 14.7, Platelet 

Count 170, Mean Platelet Volume 8.2, Neutrophils (%) (Auto) , Lymphocytes (%) (

Auto) , Monocytes (%) (Auto) , Eosinophils (%) (Auto) , Basophils (%) (Auto) , 

Differential Total Cells Counted 100, Neutrophils % (Manual) 52, Lymphocytes % (

Manual) 43, Monocytes % (Manual) 2, Eosinophils % (Manual) 3, Basophils % (

Manual) 0, Band Neutrophils 0, Platelet Estimate Adequate, Platelet Morphology 

Normal, Anisocytosis 1+, Sodium Level 142, Potassium Level 3.6, Chloride Level 

107, Carbon Dioxide Level 29, Anion Gap 6, Blood Urea Nitrogen 20H, Creatinine 

1.0, Estimat Glomerular Filtration Rate , Glucose Level 59L, Calcium Level 9.0, 

Total Bilirubin 0.2, Aspartate Amino Transf (AST/SGOT) 27, Alanine 

Aminotransferase (ALT/SGPT) 19, Alkaline Phosphatase 81, Total Protein 7.1, 

Albumin 2.9L, Globulin 4.2, Albumin/Globulin Ratio 0.7L


Height (Feet):  5


Height (Inches):  2.00


Weight (Pounds):  110


General Appearance:  no apparent distress, alert


Neurologic:  oriented x 3, responsive, depressed affect











Phoenix Izquierdo M.D. Apr 27, 2018 15:18

## 2018-04-27 NOTE — GENERAL PROGRESS NOTE
Assessment/Plan


Assessment/Plan


(1) Multiple Joint OA


(2) Multiple Joint Pain


She will be continued on Bengay and Tylenol as needed


D/w Dr. Christie he concurred.





Subjective


Date patient seen:  Apr 27, 2018


Time patient seen:  07:30 - am


Allergies:  


Coded Allergies:  


     CODEINE (Verified  Allergy, Mild, Itching, 12/26/14)


Subjective


REVIEW OF SYSTEMS:  Denies rash, fever, chills, sweating, dizziness,


drowsiness, blurred vision, sore throat, or change in weight.  She is


complaining of joint pain.





SUBJECTIVE: She is in bed and reports that her pain has been stable 


and tolerated on the cream and Tylenol as needed. No new complaints.





Objective





Last 24 Hour Vital Signs








  Date Time  Temp Pulse Resp B/P (MAP) Pulse Ox O2 Delivery O2 Flow Rate FiO2


 


4/27/18 08:29  70  100/59    


 


4/27/18 08:28    100/59    


 


4/27/18 04:30     98 Room Air  


 


4/27/18 04:29 97.2 78 20 116/63 98 Room Air  





 97.2 78      


 


4/27/18 00:42     96 Room Air  


 


4/27/18 00:29 98.2 77 20 105/56 96 Room Air  





 98.2 77      


 


4/26/18 21:00     96 Room Air  


 


4/26/18 21:00 98.2 95 20 158/85 96 Room Air  





 98.2 95      


 


4/26/18 17:21 98.1       


 


4/26/18 16:22 98.1       


 


4/26/18 15:57 98.1 82 20 98/55 99   





 98.1       


 


4/26/18 11:47 98.2 72 20 101/62 98   





 98.2       


 


4/26/18 09:12 97.7       


 


4/26/18 09:00    98/57    


 


4/26/18 09:00  74  98/57    

















Intake and Output  


 


 4/26/18 4/27/18





 19:00 07:00


 


Intake Total 720 ml 


 


Output Total 400 ml 


 


Balance 320 ml 


 


  


 


Intake Oral 720 ml 


 


Output Urine Total 400 ml 








Laboratory Tests


4/27/18 06:10: 


White Blood Count 3.4L, Red Blood Count 3.86L, Hemoglobin 11.6L, Hematocrit 

35.1L, Mean Corpuscular Volume 91, Mean Corpuscular Hemoglobin 30.0, Mean 

Corpuscular Hemoglobin Concent 33.0, Red Cell Distribution Width 14.7, Platelet 

Count 170, Mean Platelet Volume 8.2, Neutrophils (%) (Auto) , Lymphocytes (%) (

Auto) , Monocytes (%) (Auto) , Eosinophils (%) (Auto) , Basophils (%) (Auto) , 

Neutrophils % (Manual) [Pending], Lymphocytes % (Manual) [Pending], Platelet 

Estimate [Pending], Platelet Morphology [Pending], Sodium Level 142, Potassium 

Level 3.6, Chloride Level 107, Carbon Dioxide Level 29, Anion Gap 6, Blood Urea 

Nitrogen 20H, Creatinine 1.0, Estimat Glomerular Filtration Rate , Glucose 

Level 59L, Calcium Level 9.0, Total Bilirubin 0.2, Aspartate Amino Transf (AST/

SGOT) 27, Alanine Aminotransferase (ALT/SGPT) 19, Alkaline Phosphatase 81, 

Total Protein 7.1, Albumin 2.9L, Globulin 4.2, Albumin/Globulin Ratio 0.7L


Height (Feet):  5


Height (Inches):  2.00


Weight (Pounds):  110


Objective


GENERAL:  Alert, awake, and oriented.


HEENT:  PERRLA.


NECK:  Range of motion is full in all directions.  No tenderness to


paracervical muscles.  No adenopathy.


LUNGS:  Decreased breath sounds bilaterally.


HEART:  Regular.


ABDOMEN:  Benign.


BACK:  Range of motion is decreased in flexion and extension.


EXTREMITIES:  Upper and lower extremity range of motion is decreased due to


the patient's pain and condition.  No cyanosis.  No clubbing.  No edema.


Sensory is intact.   Reflexes are not obtainable.  No adenopathy.











KVNG DOMÍNGUEZ Apr 27, 2018 08:57

## 2018-04-27 NOTE — INFECTIOUS DISEASES PROG NOTE
Assessment/Plan


Assessment/Plan


A;


E. coli UTI


bladder prolapse


Cirrhosis


Osteoporosis 


compression fracture


dementia


P;


Continue Rocephin


in case of discharge PO Keflex X 3 days





Subjective


ROS Limited/Unobtainable:  No


Cardiovascular:  Reports: no symptoms


Gastrointestinal/Abdominal:  Reports: no symptoms


Genitourinary:  Reports: no symptoms


Musculoskeletal:  Reports: pain, other - in R thigh


Allergies:  


Coded Allergies:  


     CODEINE (Verified  Allergy, Mild, Itching, 12/26/14)





Objective


Vital Signs





Last 24 Hour Vital Signs








  Date Time  Temp Pulse Resp B/P (MAP) Pulse Ox O2 Delivery O2 Flow Rate FiO2


 


4/27/18 12:00 97.7 79 20 100/58 98 Room Air  





 97.7 79      


 


4/27/18 08:29  70  100/59    


 


4/27/18 08:28    100/59    


 


4/27/18 08:00 97.9 70 20 100/57 98   





 97.9       


 


4/27/18 04:30     98 Room Air  


 


4/27/18 04:29 97.2 78 20 116/63 98 Room Air  





 97.2 78      


 


4/27/18 00:42     96 Room Air  


 


4/27/18 00:29 98.2 77 20 105/56 96 Room Air  





 98.2 77      


 


4/26/18 21:00     96 Room Air  


 


4/26/18 21:00 98.2 95 20 158/85 96 Room Air  





 98.2 95      


 


4/26/18 17:21 98.1       


 


4/26/18 16:22 98.1       


 


4/26/18 15:57 98.1 82 20 98/55 99   





 98.1       








Height (Feet):  5


Height (Inches):  2.00


Weight (Pounds):  110


General Appearance:  no acute distress


HEENT:  mucous membranes moist


Respiratory/Chest:  lungs clear


Cardiovascular:  normal rate


Abdomen:  soft, non tender


Genitourinary:  other - Torres catheter, hematuria


Extremities:  no edema


Neurologic/Psychiatric:  alert, responsive





Laboratory Tests








Test


  4/27/18


06:10


 


White Blood Count


  3.4 K/UL


(4.8-10.8)  L


 


Red Blood Count


  3.86 M/UL


(4.20-5.40)  L


 


Hemoglobin


  11.6 G/DL


(12.0-16.0)  L


 


Hematocrit


  35.1 %


(37.0-47.0)  L


 


Mean Corpuscular Volume 91 FL (80-99)  


 


Mean Corpuscular Hemoglobin


  30.0 PG


(27.0-31.0)


 


Mean Corpuscular Hemoglobin


Concent 33.0 G/DL


(32.0-36.0)


 


Red Cell Distribution Width


  14.7 %


(11.6-14.8)


 


Platelet Count


  170 K/UL


(150-450)


 


Mean Platelet Volume


  8.2 FL


(6.5-10.1)


 


Neutrophils (%) (Auto)


  % (45.0-75.0)


 


 


Lymphocytes (%) (Auto)


  % (20.0-45.0)


 


 


Monocytes (%) (Auto)  % (1.0-10.0)  


 


Eosinophils (%) (Auto)  % (0.0-3.0)  


 


Basophils (%) (Auto)  % (0.0-2.0)  


 


Differential Total Cells


Counted 100  


 


 


Neutrophils % (Manual) 52 % (45-75)  


 


Lymphocytes % (Manual) 43 % (20-45)  


 


Monocytes % (Manual) 2 % (1-10)  


 


Eosinophils % (Manual) 3 % (0-3)  


 


Basophils % (Manual) 0 % (0-2)  


 


Band Neutrophils 0 % (0-8)  


 


Platelet Estimate Adequate  


 


Platelet Morphology Normal  


 


Anisocytosis 1+  


 


Sodium Level


  142 MMOL/L


(136-145)


 


Potassium Level


  3.6 MMOL/L


(3.5-5.1)


 


Chloride Level


  107 MMOL/L


()


 


Carbon Dioxide Level


  29 MMOL/L


(21-32)


 


Anion Gap


  6 mmol/L


(5-15)


 


Blood Urea Nitrogen


  20 mg/dL


(7-18)  H


 


Creatinine


  1.0 MG/DL


(0.55-1.30)


 


Estimat Glomerular Filtration


Rate  mL/min (>60)  


 


 


Glucose Level


  59 MG/DL


()  L


 


Calcium Level


  9.0 MG/DL


(8.5-10.1)


 


Total Bilirubin


  0.2 MG/DL


(0.2-1.0)


 


Aspartate Amino Transf


(AST/SGOT) 27 U/L (15-37)


 


 


Alanine Aminotransferase


(ALT/SGPT) 19 U/L (12-78)


 


 


Alkaline Phosphatase


  81 U/L


()


 


Total Protein


  7.1 G/DL


(6.4-8.2)


 


Albumin


  2.9 G/DL


(3.4-5.0)  L


 


Globulin 4.2 g/dL  


 


Albumin/Globulin Ratio


  0.7 (1.0-2.7)


L











Current Medications








 Medications


  (Trade)  Dose


 Ordered  Sig/Carl


 Route


 PRN Reason  Start Time


 Stop Time Status Last Admin


Dose Admin


 


 Acetaminophen


  (Tylenol)  650 mg  QIDPRN  PRN


 ORAL


 Mild Pain/Temp > 100.5  4/25/18 09:15


 5/25/18 09:14  4/26/18 16:22


 


 


 Amlodipine


 Besylate


  (Norvasc)  5 mg  DAILY


 ORAL


   4/25/18 09:00


 5/25/18 08:59  4/25/18 08:19


 


 


 Aspirin


  (Ecotrin)  81 mg  DAILY


 ORAL


   4/25/18 09:00


 5/25/18 08:59  4/27/18 08:26


 


 


 Atorvastatin


 Calcium


  (Lipitor)  10 mg  QHS


 ORAL


   4/24/18 21:00


 5/24/18 20:59  4/26/18 21:48


 


 


 Ceftriaxone


 Sodium 1 gm/


 Dextrose  55 ml @ 


 110 mls/hr  Q24H


 IVPB


   4/26/18 14:30


 5/3/18 14:29  4/26/18 14:59


 


 


 Docusate Sodium


  (Colace)  100 mg  BID


 ORAL


   4/24/18 18:00


 5/24/18 17:59  4/27/18 08:26


 


 


 Donepezil HCl


  (Aricept)  10 mg  DAILY


 ORAL


   4/25/18 09:00


 5/25/18 08:59  4/27/18 08:26


 


 


 Fish Oil


  (Fish Oil)  1,000 mg  DAILY


 ORAL


   4/25/18 09:00


 5/25/18 08:59  4/27/18 08:26


 


 


 Gabapentin


  (Neurontin)  100 mg  BID


 ORAL


   4/24/18 18:00


 5/24/18 17:59  4/27/18 08:26


 


 


 Irbesartan


  (Avapro)  150 mg  DAILY


 ORAL


   4/25/18 09:00


 5/25/18 08:59  4/25/18 08:18


 


 


 Megestrol Acetate


  (Megace)  40 mg  TWICE A  DAY


 ORAL


   4/24/18 18:00


 5/24/18 17:59  4/27/18 08:26


 


 


 Menthol/Methyl


 Salicylate


  (Bengay)  1 applic  FOUR TIMES A  DAY


 TOPIC


   4/27/18 09:00


 5/27/18 08:59  4/27/18 09:05


 


 


 Oxcarbazepine


  (Trileptal)  150 mg  DAILY


 ORAL


   4/25/18 09:00


 5/25/18 08:59  4/27/18 08:26


 


 


 Risperidone


  (RisperDAL)  0.5 mg  Q12H  PRN


 ORAL


 Agitation  4/24/18 17:00


 5/24/18 16:59   


 


 


 Thiamine HCl


  (Vitamin B1)  100 mg  DAILY


 ORAL


   4/25/18 09:00


 5/25/18 08:59  4/27/18 08:26


 


 


 Vitamin D


  (Vitamin D)  1,000 intlu  DAILY


 ORAL


   4/25/18 09:00


 5/25/18 08:59  4/27/18 08:27


 

















MACIE THORNE Apr 27, 2018 13:24

## 2018-04-27 NOTE — NEPHROLOGY PROGRESS NOTE
Assessment/Plan


Problem List:  


(1) Hematuria


(2) Urinary tract infection


(3) HTN (hypertension), benign


Assessment


UTI


Bladder Prolapse


Psych Ds


Plan





per ID and Uro


adjust BP meds





Subjective


ROS Limited/Unobtainable:  No





Objective


Objective





Last 24 Hour Vital Signs








  Date Time  Temp Pulse Resp B/P (MAP) Pulse Ox O2 Delivery O2 Flow Rate FiO2


 


4/27/18 12:00 97.7 79 20 100/58 98 Room Air  





 97.7 79      


 


4/27/18 08:29  70  100/59    


 


4/27/18 08:28    100/59    


 


4/27/18 08:00 97.9 70 20 100/57 98   





 97.9       


 


4/27/18 04:30     98 Room Air  


 


4/27/18 04:29 97.2 78 20 116/63 98 Room Air  





 97.2 78      


 


4/27/18 00:42     96 Room Air  


 


4/27/18 00:29 98.2 77 20 105/56 96 Room Air  





 98.2 77      


 


4/26/18 21:00     96 Room Air  


 


4/26/18 21:00 98.2 95 20 158/85 96 Room Air  





 98.2 95      


 


4/26/18 17:21 98.1       


 


4/26/18 16:22 98.1       


 


4/26/18 15:57 98.1 82 20 98/55 99   





 98.1       

















Intake and Output  


 


 4/26/18 4/27/18





 19:00 07:00


 


Intake Total 720 ml 


 


Output Total 400 ml 


 


Balance 320 ml 


 


  


 


Intake Oral 720 ml 


 


Output Urine Total 400 ml 








Laboratory Tests


4/27/18 06:10: 


White Blood Count 3.4L, Red Blood Count 3.86L, Hemoglobin 11.6L, Hematocrit 

35.1L, Mean Corpuscular Volume 91, Mean Corpuscular Hemoglobin 30.0, Mean 

Corpuscular Hemoglobin Concent 33.0, Red Cell Distribution Width 14.7, Platelet 

Count 170, Mean Platelet Volume 8.2, Neutrophils (%) (Auto) , Lymphocytes (%) (

Auto) , Monocytes (%) (Auto) , Eosinophils (%) (Auto) , Basophils (%) (Auto) , 

Differential Total Cells Counted 100, Neutrophils % (Manual) 52, Lymphocytes % (

Manual) 43, Monocytes % (Manual) 2, Eosinophils % (Manual) 3, Basophils % (

Manual) 0, Band Neutrophils 0, Platelet Estimate Adequate, Platelet Morphology 

Normal, Anisocytosis 1+, Sodium Level 142, Potassium Level 3.6, Chloride Level 

107, Carbon Dioxide Level 29, Anion Gap 6, Blood Urea Nitrogen 20H, Creatinine 

1.0, Estimat Glomerular Filtration Rate , Glucose Level 59L, Calcium Level 9.0, 

Total Bilirubin 0.2, Aspartate Amino Transf (AST/SGOT) 27, Alanine 

Aminotransferase (ALT/SGPT) 19, Alkaline Phosphatase 81, Total Protein 7.1, 

Albumin 2.9L, Globulin 4.2, Albumin/Globulin Ratio 0.7L


Height (Feet):  5


Height (Inches):  2.00


Weight (Pounds):  110


General Appearance:  no apparent distress


Objective


no change











NO WANG Apr 27, 2018 15:29

## 2018-04-28 VITALS — SYSTOLIC BLOOD PRESSURE: 100 MMHG | DIASTOLIC BLOOD PRESSURE: 55 MMHG

## 2018-04-28 VITALS — SYSTOLIC BLOOD PRESSURE: 123 MMHG | DIASTOLIC BLOOD PRESSURE: 69 MMHG

## 2018-04-28 VITALS — DIASTOLIC BLOOD PRESSURE: 60 MMHG | SYSTOLIC BLOOD PRESSURE: 110 MMHG

## 2018-04-28 VITALS — SYSTOLIC BLOOD PRESSURE: 101 MMHG | DIASTOLIC BLOOD PRESSURE: 51 MMHG

## 2018-04-28 VITALS — SYSTOLIC BLOOD PRESSURE: 113 MMHG | DIASTOLIC BLOOD PRESSURE: 64 MMHG

## 2018-04-28 VITALS — DIASTOLIC BLOOD PRESSURE: 76 MMHG | SYSTOLIC BLOOD PRESSURE: 139 MMHG

## 2018-04-28 LAB
ADD MANUAL DIFF: NO
ALBUMIN SERPL-MCNC: 2.5 G/DL (ref 3.4–5)
ALBUMIN/GLOB SERPL: 0.7 {RATIO} (ref 1–2.7)
ALP SERPL-CCNC: 57 U/L (ref 46–116)
ALT SERPL-CCNC: 20 U/L (ref 12–78)
ANION GAP SERPL CALC-SCNC: 8 MMOL/L (ref 5–15)
AST SERPL-CCNC: 27 U/L (ref 15–37)
BASOPHILS NFR BLD AUTO: 1.6 % (ref 0–2)
BILIRUB SERPL-MCNC: 0.2 MG/DL (ref 0.2–1)
BUN SERPL-MCNC: 16 MG/DL (ref 7–18)
CALCIUM SERPL-MCNC: 8.5 MG/DL (ref 8.5–10.1)
CHLORIDE SERPL-SCNC: 108 MMOL/L (ref 98–107)
CO2 SERPL-SCNC: 24 MMOL/L (ref 21–32)
CREAT SERPL-MCNC: 0.8 MG/DL (ref 0.55–1.3)
EOSINOPHIL NFR BLD AUTO: 1 % (ref 0–3)
ERYTHROCYTE [DISTWIDTH] IN BLOOD BY AUTOMATED COUNT: 14.1 % (ref 11.6–14.8)
GLOBULIN SER-MCNC: 3.6 G/DL
HCT VFR BLD CALC: 31.9 % (ref 37–47)
HGB BLD-MCNC: 10.7 G/DL (ref 12–16)
LYMPHOCYTES NFR BLD AUTO: 41.3 % (ref 20–45)
MCV RBC AUTO: 90 FL (ref 80–99)
MONOCYTES NFR BLD AUTO: 10.3 % (ref 1–10)
NEUTROPHILS NFR BLD AUTO: 45.8 % (ref 45–75)
PLATELET # BLD: 150 K/UL (ref 150–450)
POTASSIUM SERPL-SCNC: 4 MMOL/L (ref 3.5–5.1)
RBC # BLD AUTO: 3.54 M/UL (ref 4.2–5.4)
SODIUM SERPL-SCNC: 140 MMOL/L (ref 136–145)
WBC # BLD AUTO: 3.7 K/UL (ref 4.8–10.8)

## 2018-04-28 RX ADMIN — IRBESARTAN SCH MG: 150 TABLET, FILM COATED ORAL at 09:49

## 2018-04-28 RX ADMIN — ANALGESIC BALM SCH APPLIC: 1.74; 4.06 OINTMENT TOPICAL at 09:51

## 2018-04-28 RX ADMIN — Medication SCH MG: at 09:50

## 2018-04-28 RX ADMIN — ANALGESIC BALM SCH APPLIC: 1.74; 4.06 OINTMENT TOPICAL at 20:49

## 2018-04-28 RX ADMIN — OXCARBAZEPINE SCH MG: 150 TABLET, FILM COATED ORAL at 09:49

## 2018-04-28 RX ADMIN — ANALGESIC BALM SCH APPLIC: 1.74; 4.06 OINTMENT TOPICAL at 13:49

## 2018-04-28 RX ADMIN — ASPIRIN SCH MG: 81 TABLET, DELAYED RELEASE ORAL at 09:49

## 2018-04-28 RX ADMIN — ANALGESIC BALM SCH APPLIC: 1.74; 4.06 OINTMENT TOPICAL at 17:28

## 2018-04-28 RX ADMIN — VITAMIN D, TAB 1000IU (100/BT) SCH INTLU: 25 TAB at 09:50

## 2018-04-28 RX ADMIN — SODIUM CHLORIDE SCH MLS/HR: 0.9 INJECTION INTRAVENOUS at 13:51

## 2018-04-28 RX ADMIN — DOCUSATE SODIUM SCH MG: 100 CAPSULE, LIQUID FILLED ORAL at 09:50

## 2018-04-28 RX ADMIN — DOCUSATE SODIUM SCH MG: 100 CAPSULE, LIQUID FILLED ORAL at 17:27

## 2018-04-28 RX ADMIN — DONEPEZIL HYDROCHLORIDE SCH MG: 10 TABLET, FILM COATED ORAL at 09:50

## 2018-04-28 NOTE — GENERAL PROGRESS NOTE
Assessment/Plan


Problem List:  


(1) Urinary tract infection


ICD Codes:  N39.0 - Urinary tract infection, site not specified


SNOMED:  29316679


(2) Hematuria


ICD Codes:  R31.9 - Hematuria, unspecified


SNOMED:  60879024


(3) HTN (hypertension), benign


ICD Codes:  I10 - HTN (hypertension), benign


SNOMED:  99190012


Status:  progressing


Assessment/Plan


uti is 


check h/h


afebrile


reviewed chart and labs


hematurea improving


atonic bladder





Subjective


ROS Limited/Unobtainable:  Yes


Allergies:  


Coded Allergies:  


     CODEINE (Verified  Allergy, Mild, Itching, 12/26/14)





Objective





Last 24 Hour Vital Signs








  Date Time  Temp Pulse Resp B/P (MAP) Pulse Ox O2 Delivery O2 Flow Rate FiO2


 


4/28/18 16:00 98.1 74 19 113/64 99   





 98.1       


 


4/28/18 12:00 98.4 76 18 139/76 97   





 98.4       


 


4/28/18 09:50  69  123/69    


 


4/28/18 09:49    123/69    


 


4/28/18 08:00 97.3 69 19 123/69 98   





 97.3       


 


4/28/18 04:00 98.2 70 20 100/55 100 Room Air  





 98.2       


 


4/28/18 04:00     100 Room Air  


 


4/28/18 00:00     98 Room Air  


 


4/28/18 00:00 98.6 70 20 110/60 98 Room Air  





 98.6       


 


4/27/18 20:04 99.0 89 20 107/62 97 Room Air  





 99.0 84      


 


4/27/18 20:04     97 Room Air  

















Intake and Output  


 


 4/27/18 4/28/18





 19:00 07:00


 


Intake Total 750 ml 


 


Output Total 850 ml 550 ml


 


Balance -100 ml -550 ml


 


  


 


Intake Oral 750 ml 


 


Output Urine Total 850 ml 550 ml


 


# Bowel Movements 1 1








Laboratory Tests


4/28/18 05:35: 


White Blood Count 3.7L, Red Blood Count 3.54L, Hemoglobin 10.7L, Hematocrit 

31.9L, Mean Corpuscular Volume 90, Mean Corpuscular Hemoglobin 30.1, Mean 

Corpuscular Hemoglobin Concent 33.5, Red Cell Distribution Width 14.1, Platelet 

Count 150, Mean Platelet Volume 8.3, Neutrophils (%) (Auto) 45.8, Lymphocytes (%

) (Auto) 41.3, Monocytes (%) (Auto) 10.3H, Eosinophils (%) (Auto) 1.0, 

Basophils (%) (Auto) 1.6, Sodium Level 140, Potassium Level 4.0, Chloride Level 

108H, Carbon Dioxide Level 24, Anion Gap 8, Blood Urea Nitrogen 16, Creatinine 

0.8, Estimat Glomerular Filtration Rate , Glucose Level 85, Calcium Level 8.5, 

Total Bilirubin 0.2, Aspartate Amino Transf (AST/SGOT) 27, Alanine 

Aminotransferase (ALT/SGPT) 20, Alkaline Phosphatase 57, Total Protein 6.1L, 

Albumin 2.5L, Globulin 3.6, Albumin/Globulin Ratio 0.7L


Height (Feet):  5


Height (Inches):  2.00


Weight (Pounds):  110











Kylah Osborn MD Apr 28, 2018 16:55

## 2018-04-28 NOTE — NEPHROLOGY PROGRESS NOTE
Assessment/Plan


Problem List:  


(1) Hematuria


(2) Urinary tract infection


(3) HTN (hypertension), benign


Assessment


UTI


Bladder Prolapse


Psych Ds


Plan





per ID and Uro


adjust BP meds





Subjective


ROS Limited/Unobtainable:  No





Objective


Objective





Last 24 Hour Vital Signs








  Date Time  Temp Pulse Resp B/P (MAP) Pulse Ox O2 Delivery O2 Flow Rate FiO2


 


4/28/18 09:50  69  123/69    


 


4/28/18 09:49    123/69    


 


4/28/18 08:00 97.3 69 19 123/69 98   





 97.3       


 


4/28/18 04:00 98.2 70 20 100/55 100 Room Air  





 98.2       


 


4/28/18 04:00     100 Room Air  


 


4/28/18 00:00     98 Room Air  


 


4/28/18 00:00 98.6 70 20 110/60 98 Room Air  





 98.6       


 


4/27/18 20:04 99.0 89 20 107/62 97 Room Air  





 99.0 84      


 


4/27/18 20:04     97 Room Air  


 


4/27/18 16:00 97.7 73 19 102/55 97   





 97.7       


 


4/27/18 12:00 97.7 79 20 100/58 98 Room Air  





 97.7 79      

















Intake and Output  


 


 4/27/18 4/28/18





 19:00 07:00


 


Intake Total 750 ml 


 


Output Total 850 ml 550 ml


 


Balance -100 ml -550 ml


 


  


 


Intake Oral 750 ml 


 


Output Urine Total 850 ml 550 ml


 


# Bowel Movements 1 1








Laboratory Tests


4/28/18 05:35: 


White Blood Count 3.7L, Red Blood Count 3.54L, Hemoglobin 10.7L, Hematocrit 

31.9L, Mean Corpuscular Volume 90, Mean Corpuscular Hemoglobin 30.1, Mean 

Corpuscular Hemoglobin Concent 33.5, Red Cell Distribution Width 14.1, Platelet 

Count 150, Mean Platelet Volume 8.3, Neutrophils (%) (Auto) 45.8, Lymphocytes (%

) (Auto) 41.3, Monocytes (%) (Auto) 10.3H, Eosinophils (%) (Auto) 1.0, 

Basophils (%) (Auto) 1.6, Sodium Level 140, Potassium Level 4.0, Chloride Level 

108H, Carbon Dioxide Level 24, Anion Gap 8, Blood Urea Nitrogen 16, Creatinine 

0.8, Estimat Glomerular Filtration Rate , Glucose Level 85, Calcium Level 8.5, 

Total Bilirubin 0.2, Aspartate Amino Transf (AST/SGOT) 27, Alanine 

Aminotransferase (ALT/SGPT) 20, Alkaline Phosphatase 57, Total Protein 6.1L, 

Albumin 2.5L, Globulin 3.6, Albumin/Globulin Ratio 0.7L


Height (Feet):  5


Height (Inches):  2.00


Weight (Pounds):  110


General Appearance:  no apparent distress


Objective


no change











NO WANG Apr 28, 2018 10:32

## 2018-04-29 VITALS — DIASTOLIC BLOOD PRESSURE: 64 MMHG | SYSTOLIC BLOOD PRESSURE: 101 MMHG

## 2018-04-29 VITALS — DIASTOLIC BLOOD PRESSURE: 57 MMHG | SYSTOLIC BLOOD PRESSURE: 110 MMHG

## 2018-04-29 VITALS — SYSTOLIC BLOOD PRESSURE: 107 MMHG | DIASTOLIC BLOOD PRESSURE: 66 MMHG

## 2018-04-29 VITALS — SYSTOLIC BLOOD PRESSURE: 110 MMHG | DIASTOLIC BLOOD PRESSURE: 69 MMHG

## 2018-04-29 VITALS — DIASTOLIC BLOOD PRESSURE: 72 MMHG | SYSTOLIC BLOOD PRESSURE: 131 MMHG

## 2018-04-29 VITALS — SYSTOLIC BLOOD PRESSURE: 129 MMHG | DIASTOLIC BLOOD PRESSURE: 60 MMHG

## 2018-04-29 VITALS — DIASTOLIC BLOOD PRESSURE: 56 MMHG | SYSTOLIC BLOOD PRESSURE: 112 MMHG

## 2018-04-29 RX ADMIN — ANALGESIC BALM SCH APPLIC: 1.74; 4.06 OINTMENT TOPICAL at 20:49

## 2018-04-29 RX ADMIN — DOCUSATE SODIUM SCH MG: 100 CAPSULE, LIQUID FILLED ORAL at 09:32

## 2018-04-29 RX ADMIN — Medication SCH MG: at 09:31

## 2018-04-29 RX ADMIN — IRBESARTAN SCH MG: 150 TABLET, FILM COATED ORAL at 10:27

## 2018-04-29 RX ADMIN — ANALGESIC BALM SCH APPLIC: 1.74; 4.06 OINTMENT TOPICAL at 09:32

## 2018-04-29 RX ADMIN — VITAMIN D, TAB 1000IU (100/BT) SCH INTLU: 25 TAB at 09:31

## 2018-04-29 RX ADMIN — ANALGESIC BALM SCH APPLIC: 1.74; 4.06 OINTMENT TOPICAL at 12:58

## 2018-04-29 RX ADMIN — OXCARBAZEPINE SCH MG: 150 TABLET, FILM COATED ORAL at 09:31

## 2018-04-29 RX ADMIN — DOCUSATE SODIUM SCH MG: 100 CAPSULE, LIQUID FILLED ORAL at 17:39

## 2018-04-29 RX ADMIN — ANALGESIC BALM SCH APPLIC: 1.74; 4.06 OINTMENT TOPICAL at 17:39

## 2018-04-29 RX ADMIN — ASPIRIN SCH MG: 81 TABLET, DELAYED RELEASE ORAL at 09:31

## 2018-04-29 RX ADMIN — DONEPEZIL HYDROCHLORIDE SCH MG: 10 TABLET, FILM COATED ORAL at 09:31

## 2018-04-29 RX ADMIN — SODIUM CHLORIDE SCH MLS/HR: 0.9 INJECTION INTRAVENOUS at 14:14

## 2018-04-29 NOTE — NEPHROLOGY PROGRESS NOTE
Assessment/Plan


Problem List:  


(1) Hematuria


(2) Urinary tract infection


(3) HTN (hypertension), benign


Assessment


UTI


Bladder Prolapse


Psych Ds


Plan





per ID and Uro


adjust BP meds





Subjective


ROS Limited/Unobtainable:  No





Objective


Objective





Last 24 Hour Vital Signs








  Date Time  Temp Pulse Resp B/P (MAP) Pulse Ox O2 Delivery O2 Flow Rate FiO2


 


4/29/18 10:27    131/72    


 


4/29/18 10:20  64  131/72    


 


4/29/18 09:45    131/72    


 


4/29/18 08:00 98.1 64 20 101/64 99   





 98.1       


 


4/29/18 04:00 97.9 70 20 110/69 96 Room Air  





 97.9       


 


4/29/18 00:00 98.1 72 18 107/66 97 Room Air  





 98.1       


 


4/28/18 20:00 98.8 71 17 101/51 97 Room Air  





 98.8       


 


4/28/18 16:00 98.1 74 19 113/64 99   





 98.1       


 


4/28/18 12:00 98.4 76 18 139/76 97   





 98.4       

















Intake and Output  


 


 4/28/18 4/29/18





 19:00 07:00


 


Intake Total 625 ml 


 


Output Total 375 ml 550 ml


 


Balance 250 ml -550 ml


 


  


 


Intake Oral 570 ml 


 


IV Total 55 ml 


 


Output Urine Total 375 ml 550 ml


 


# Bowel Movements 2 








Height (Feet):  5


Height (Inches):  2.00


Weight (Pounds):  110


General Appearance:  no apparent distress


Objective


no change











NO WANG Apr 29, 2018 11:43

## 2018-04-29 NOTE — INFECTIOUS DISEASES PROG NOTE
Assessment/Plan


Assessment/Plan


A;


E. coli UTI


bladder prolapse


Cirrhosis


Osteoporosis 


compression fracture


dementia


P;


Continue Rocephin X 1 day





Subjective


ROS Limited/Unobtainable:  No


Constitutional:  Reports: no symptoms


Respiratory:  Reports: no symptoms


Cardiovascular:  Reports: no symptoms


Gastrointestinal/Abdominal:  Reports: no symptoms


Genitourinary:  Reports: no symptoms


Musculoskeletal:  Reports: pain


Allergies:  


Coded Allergies:  


     CODEINE (Verified  Allergy, Mild, Itching, 12/26/14)





Objective


Vital Signs





Last 24 Hour Vital Signs








  Date Time  Temp Pulse Resp B/P (MAP) Pulse Ox O2 Delivery O2 Flow Rate FiO2


 


4/29/18 12:00 99.0 69 19 129/60 100   





 99.0       


 


4/29/18 10:27    131/72    


 


4/29/18 10:20  64  131/72    


 


4/29/18 09:45    131/72    


 


4/29/18 08:00 98.1 64 20 101/64 99   





 98.1       


 


4/29/18 04:00 97.9 70 20 110/69 96 Room Air  





 97.9       


 


4/29/18 00:00 98.1 72 18 107/66 97 Room Air  





 98.1       


 


4/28/18 20:00 98.8 71 17 101/51 97 Room Air  





 98.8       


 


4/28/18 16:00 98.1 74 19 113/64 99   





 98.1       








Height (Feet):  5


Height (Inches):  2.00


Weight (Pounds):  110


General Appearance:  no acute distress


HEENT:  mucous membranes moist


Respiratory/Chest:  lungs clear


Cardiovascular:  normal rate


Abdomen:  soft, non tender


Genitourinary:  other - Torres catheter


Extremities:  no edema


Neurologic/Psychiatric:  alert, responsive





Current Medications








 Medications


  (Trade)  Dose


 Ordered  Sig/Carl


 Route


 PRN Reason  Start Time


 Stop Time Status Last Admin


Dose Admin


 


 Acetaminophen


  (Tylenol)  650 mg  QIDPRN  PRN


 ORAL


 Mild Pain/Temp > 100.5  4/25/18 09:15


 5/25/18 09:14  4/26/18 16:22


 


 


 Amlodipine


 Besylate


  (Norvasc)  2.5 mg  DAILY


 ORAL


   4/30/18 09:00


 5/30/18 08:59   


 


 


 Aspirin


  (Ecotrin)  81 mg  DAILY


 ORAL


   4/25/18 09:00


 5/25/18 08:59  4/29/18 09:31


 


 


 Atorvastatin


 Calcium


  (Lipitor)  10 mg  QHS


 ORAL


   4/24/18 21:00


 5/24/18 20:59  4/28/18 20:48


 


 


 Ceftriaxone


 Sodium 1 gm/


 Dextrose  55 ml @ 


 110 mls/hr  Q24H


 IVPB


   4/26/18 14:30


 5/3/18 14:29  4/28/18 13:51


 


 


 Docusate Sodium


  (Colace)  100 mg  BID


 ORAL


   4/24/18 18:00


 5/24/18 17:59  4/29/18 09:32


 


 


 Donepezil HCl


  (Aricept)  10 mg  DAILY


 ORAL


   4/25/18 09:00


 5/25/18 08:59  4/29/18 09:31


 


 


 Fish Oil


  (Fish Oil)  1,000 mg  DAILY


 ORAL


   4/25/18 09:00


 5/25/18 08:59  4/29/18 09:31


 


 


 Gabapentin


  (Neurontin)  100 mg  BID


 ORAL


   4/24/18 18:00


 5/24/18 17:59  4/29/18 09:32


 


 


 Irbesartan


  (Avapro)  150 mg  DAILY


 ORAL


   4/25/18 09:00


 5/25/18 08:59  4/29/18 10:27


 


 


 Megestrol Acetate


  (Megace)  40 mg  TWICE A  DAY


 ORAL


   4/24/18 18:00


 5/24/18 17:59  4/29/18 09:32


 


 


 Menthol/Methyl


 Salicylate


  (Bengay)  1 applic  FOUR TIMES A  DAY


 TOPIC


   4/27/18 09:00


 5/27/18 08:59  4/29/18 12:58


 


 


 Oxcarbazepine


  (Trileptal)  150 mg  DAILY


 ORAL


   4/25/18 09:00


 5/25/18 08:59  4/29/18 09:31


 


 


 Risperidone


  (RisperDAL)  0.5 mg  Q12H  PRN


 ORAL


 Agitation  4/24/18 17:00


 5/24/18 16:59   


 


 


 Thiamine HCl


  (Vitamin B1)  100 mg  DAILY


 ORAL


   4/25/18 09:00


 5/25/18 08:59  4/29/18 09:31


 


 


 Vitamin D


  (Vitamin D)  1,000 intlu  DAILY


 ORAL


   4/25/18 09:00


 5/25/18 08:59  4/29/18 09:31


 

















MACIE THORNE Apr 29, 2018 13:20

## 2018-04-29 NOTE — GENERAL PROGRESS NOTE
Assessment/Plan


Problem List:  


(1) Urinary tract infection


ICD Codes:  N39.0 - Urinary tract infection, site not specified


SNOMED:  12928892


(2) Hematuria


ICD Codes:  R31.9 - Hematuria, unspecified


SNOMED:  42851079


(3) HTN (hypertension), benign


ICD Codes:  I10 - HTN (hypertension), benign


SNOMED:  70489340


Status:  progressing


Assessment/Plan


uti is improving


hematurea improved


dc in am 


AFEBRILE


reviewed chart and labs





Subjective


ROS Limited/Unobtainable:  Yes


Allergies:  


Coded Allergies:  


     CODEINE (Verified  Allergy, Mild, Itching, 12/26/14)





Objective





Last 24 Hour Vital Signs








  Date Time  Temp Pulse Resp B/P (MAP) Pulse Ox O2 Delivery O2 Flow Rate FiO2


 


4/29/18 12:00 99.0 69 19 129/60 100   





 99.0       


 


4/29/18 10:27    131/72    


 


4/29/18 10:20  64  131/72    


 


4/29/18 09:45    131/72    


 


4/29/18 08:00 98.1 64 20 101/64 99   





 98.1       


 


4/29/18 04:00 97.9 70 20 110/69 96 Room Air  





 97.9       


 


4/29/18 00:00 98.1 72 18 107/66 97 Room Air  





 98.1       


 


4/28/18 20:00 98.8 71 17 101/51 97 Room Air  





 98.8       


 


4/28/18 16:00 98.1 74 19 113/64 99   





 98.1       

















Intake and Output  


 


 4/28/18 4/29/18





 19:00 07:00


 


Intake Total 625 ml 


 


Output Total 375 ml 550 ml


 


Balance 250 ml -550 ml


 


  


 


Intake Oral 570 ml 


 


IV Total 55 ml 


 


Output Urine Total 375 ml 550 ml


 


# Bowel Movements 2 








Height (Feet):  5


Height (Inches):  2.00


Weight (Pounds):  110











Kylah Osborn MD Apr 29, 2018 14:40

## 2018-04-29 NOTE — GENERAL PROGRESS NOTE
Assessment/Plan


Assessment/Plan


(1) Multiple Joint OA


(2) Multiple Joint Pain


She will be continued on Bengay and Tylenol as needed


D/w Dr. Christie he concurred.





Subjective


Date patient seen:  Apr 29, 2018


Time patient seen:  12:00 - pm


Allergies:  


Coded Allergies:  


     CODEINE (Verified  Allergy, Mild, Itching, 12/26/14)


Subjective


REVIEW OF SYSTEMS:  Denies rash, fever, chills, sweating, dizziness,


drowsiness, blurred vision, sore throat, or change in weight.  She is


complaining of joint pain.





SUBJECTIVE: She has no new complaints pain is tolerated on the Bengay 


and Tylenol as needed.





Objective





Last 24 Hour Vital Signs








  Date Time  Temp Pulse Resp B/P (MAP) Pulse Ox O2 Delivery O2 Flow Rate FiO2


 


4/29/18 10:27    131/72    


 


4/29/18 10:20  64  131/72    


 


4/29/18 09:45    131/72    


 


4/29/18 08:00 98.1 64 20 101/64 99   





 98.1       


 


4/29/18 04:00 97.9 70 20 110/69 96 Room Air  





 97.9       


 


4/29/18 00:00 98.1 72 18 107/66 97 Room Air  





 98.1       


 


4/28/18 20:00 98.8 71 17 101/51 97 Room Air  





 98.8       


 


4/28/18 16:00 98.1 74 19 113/64 99   





 98.1       

















Intake and Output  


 


 4/28/18 4/29/18





 19:00 07:00


 


Intake Total 625 ml 


 


Output Total 375 ml 550 ml


 


Balance 250 ml -550 ml


 


  


 


Intake Oral 570 ml 


 


IV Total 55 ml 


 


Output Urine Total 375 ml 550 ml


 


# Bowel Movements 2 








Height (Feet):  5


Height (Inches):  2.00


Weight (Pounds):  110


Objective


GENERAL:  Alert, awake, and oriented.


HEENT:  PERRLA.


NECK:  Range of motion is full in all directions.  No tenderness to


paracervical muscles.  No adenopathy.


LUNGS:  Decreased breath sounds bilaterally.


HEART:  Regular.


ABDOMEN:  Benign.


BACK:  Range of motion is decreased in flexion and extension.


EXTREMITIES:  Upper and lower extremity range of motion is decreased due to


the patient's pain and condition.  No cyanosis.  No clubbing.  No edema.


Sensory is intact.   Reflexes are not obtainable.  No adenopathy.











ZEDNER,KVNG N. P.A. Apr 29, 2018 12:26

## 2018-04-30 VITALS — DIASTOLIC BLOOD PRESSURE: 58 MMHG | SYSTOLIC BLOOD PRESSURE: 116 MMHG

## 2018-04-30 VITALS — DIASTOLIC BLOOD PRESSURE: 56 MMHG | SYSTOLIC BLOOD PRESSURE: 110 MMHG

## 2018-04-30 VITALS — SYSTOLIC BLOOD PRESSURE: 108 MMHG | DIASTOLIC BLOOD PRESSURE: 64 MMHG

## 2018-04-30 VITALS — SYSTOLIC BLOOD PRESSURE: 116 MMHG | DIASTOLIC BLOOD PRESSURE: 56 MMHG

## 2018-04-30 VITALS — SYSTOLIC BLOOD PRESSURE: 114 MMHG | DIASTOLIC BLOOD PRESSURE: 59 MMHG

## 2018-04-30 VITALS — SYSTOLIC BLOOD PRESSURE: 120 MMHG | DIASTOLIC BLOOD PRESSURE: 61 MMHG

## 2018-04-30 RX ADMIN — ANALGESIC BALM SCH APPLIC: 1.74; 4.06 OINTMENT TOPICAL at 13:28

## 2018-04-30 RX ADMIN — DOCUSATE SODIUM SCH MG: 100 CAPSULE, LIQUID FILLED ORAL at 08:52

## 2018-04-30 RX ADMIN — DONEPEZIL HYDROCHLORIDE SCH MG: 10 TABLET, FILM COATED ORAL at 08:52

## 2018-04-30 RX ADMIN — Medication SCH MG: at 08:53

## 2018-04-30 RX ADMIN — ASPIRIN SCH MG: 81 TABLET, DELAYED RELEASE ORAL at 08:52

## 2018-04-30 RX ADMIN — IRBESARTAN SCH MG: 150 TABLET, FILM COATED ORAL at 09:00

## 2018-04-30 RX ADMIN — ANALGESIC BALM SCH APPLIC: 1.74; 4.06 OINTMENT TOPICAL at 08:54

## 2018-04-30 RX ADMIN — OXCARBAZEPINE SCH MG: 150 TABLET, FILM COATED ORAL at 08:52

## 2018-04-30 RX ADMIN — SODIUM CHLORIDE SCH MLS/HR: 0.9 INJECTION INTRAVENOUS at 14:56

## 2018-04-30 RX ADMIN — VITAMIN D, TAB 1000IU (100/BT) SCH INTLU: 25 TAB at 08:53

## 2018-04-30 NOTE — GERIATRIC PROGRESS NOTE
Assessment/Plan


Assessment/Plan


Encephalopathy


Dementia with behavioral disturbance





-cont Risperdal prn


-cont Aricept.


-the pt was given ro





Subjective


Interval Events


4/29/18


Mood/Memory:  Reports: prior hx, anxiety, depressed feelings, emotional problems





Geriatric


Geriatric





Last 24 Hour Vital Signs








  Date Time  Temp Pulse Resp B/P (MAP) Pulse Ox O2 Delivery O2 Flow Rate FiO2


 


4/30/18 12:00 98.2 63 18 108/64 100   





 98.2       


 


4/30/18 10:40    120/61    


 


4/30/18 09:51 98.1       


 


4/30/18 09:00  63  108/64    


 


4/30/18 09:00    108/64    


 


4/30/18 08:52 100.0       


 


4/30/18 08:00 100.0 68 18 110/56 98   





 100.0       


 


4/30/18 04:16 98.6 66 16 114/59 98   





 98.6       


 


4/30/18 00:00      Room Air  


 


4/30/18 00:00 98.4 63 15 116/58 99   





 98.4       


 


4/29/18 20:00 98.6 68 16 110/57 98   





 98.6       


 


4/29/18 20:00      Room Air  


 


4/29/18 16:00 97.8 67 18 112/56 98   





 97.8       

















Intake and Output  


 


 4/29/18 4/30/18





 19:00 07:00


 


Intake Total 815 ml 


 


Output Total 400 ml 550 ml


 


Balance 415 ml -550 ml


 


  


 


Intake Oral 760 ml 


 


IV Total 55 ml 


 


Output Urine Total 400 ml 550 ml


 


# Bowel Movements  1











Current Medications








 Medications


  (Trade)  Dose


 Ordered  Sig/Carl


 Route


 PRN Reason  Start Time


 Stop Time Status Last Admin


Dose Admin


 


 Acetaminophen


  (Tylenol)  650 mg  QIDPRN  PRN


 ORAL


 Mild Pain/Temp > 100.5  4/25/18 09:15


 5/25/18 09:14  4/30/18 08:52


 


 


 Amlodipine


 Besylate


  (Norvasc)  2.5 mg  DAILY


 ORAL


   4/30/18 09:00


 5/30/18 08:59   


 


 


 Aspirin


  (Ecotrin)  81 mg  DAILY


 ORAL


   4/25/18 09:00


 5/25/18 08:59  4/30/18 08:52


 


 


 Atorvastatin


 Calcium


  (Lipitor)  10 mg  QHS


 ORAL


   4/24/18 21:00


 5/24/18 20:59  4/29/18 20:49


 


 


 Docusate Sodium


  (Colace)  100 mg  BID


 ORAL


   4/24/18 18:00


 5/24/18 17:59  4/30/18 08:52


 


 


 Donepezil HCl


  (Aricept)  10 mg  DAILY


 ORAL


   4/25/18 09:00


 5/25/18 08:59  4/30/18 08:52


 


 


 Fish Oil


  (Fish Oil)  1,000 mg  DAILY


 ORAL


   4/25/18 09:00


 5/25/18 08:59  4/30/18 08:53


 


 


 Gabapentin


  (Neurontin)  100 mg  BID


 ORAL


   4/24/18 18:00


 5/24/18 17:59  4/30/18 08:53


 


 


 Irbesartan


  (Avapro)  150 mg  DAILY


 ORAL


   4/25/18 09:00


 5/25/18 08:59  4/29/18 10:27


 


 


 Megestrol Acetate


  (Megace)  40 mg  TWICE A  DAY


 ORAL


   4/24/18 18:00


 5/24/18 17:59  4/30/18 08:53


 


 


 Menthol/Methyl


 Salicylate


  (Bengay)  1 applic  FOUR TIMES A  DAY


 TOPIC


   4/27/18 09:00


 5/27/18 08:59  4/30/18 13:28


 


 


 Oxcarbazepine


  (Trileptal)  150 mg  DAILY


 ORAL


   4/25/18 09:00


 5/25/18 08:59  4/30/18 08:52


 


 


 Risperidone


  (RisperDAL)  0.5 mg  Q12H  PRN


 ORAL


 Agitation  4/24/18 17:00


 5/24/18 16:59   


 


 


 Thiamine HCl


  (Vitamin B1)  100 mg  DAILY


 ORAL


   4/25/18 09:00


 5/25/18 08:59  4/30/18 08:53


 


 


 Vitamin D


  (Vitamin D)  1,000 intlu  DAILY


 ORAL


   4/25/18 09:00


 5/25/18 08:59  4/30/18 08:53


 








Height (Feet):  5


Height (Inches):  2.00


Weight (Pounds):  110


General Appearance:  well appearing, well dressed, no apparent distress, alert











Phoenix Izquierdo M.D. Apr 30, 2018 15:31

## 2018-04-30 NOTE — NEPHROLOGY PROGRESS NOTE
Assessment/Plan


Problem List:  


(1) Hematuria


(2) Urinary tract infection


(3) HTN (hypertension), benign


Assessment


UTI


Bladder Prolapse


Psych Ds


Plan





per ID and Uro


adjust BP meds





Subjective


ROS Limited/Unobtainable:  No





Objective


Objective





Last 24 Hour Vital Signs








  Date Time  Temp Pulse Resp B/P (MAP) Pulse Ox O2 Delivery O2 Flow Rate FiO2


 


4/30/18 12:00 98.2 63 18 108/64 100   





 98.2       


 


4/30/18 10:40    120/61    


 


4/30/18 09:51 98.1       


 


4/30/18 08:52 100.0       


 


4/30/18 08:00 100.0 68 18 110/56 98   





 100.0       


 


4/30/18 04:16 98.6 66 16 114/59 98   





 98.6       


 


4/30/18 00:00      Room Air  


 


4/30/18 00:00 98.4 63 15 116/58 99   





 98.4       


 


4/29/18 20:00 98.6 68 16 110/57 98   





 98.6       


 


4/29/18 20:00      Room Air  


 


4/29/18 16:00 97.8 67 18 112/56 98   





 97.8       

















Intake and Output  


 


 4/29/18 4/30/18





 19:00 07:00


 


Intake Total 815 ml 


 


Output Total 400 ml 550 ml


 


Balance 415 ml -550 ml


 


  


 


Intake Oral 760 ml 


 


IV Total 55 ml 


 


Output Urine Total 400 ml 550 ml


 


# Bowel Movements  1








Height (Feet):  5


Height (Inches):  2.00


Weight (Pounds):  110


General Appearance:  no apparent distress


Objective


no change











NO WANG Apr 30, 2018 13:15

## 2018-04-30 NOTE — GENERAL PROGRESS NOTE
Assessment/Plan


Status:  stable, progressing


Assessment/Plan


Encephalopathy


Dementia with behavioral disturbance





-cont Risperdal prn


-cont Aricept.


-the pt was given ro





Subjective


Date patient seen:  Apr 30, 2018


Neurologic/Psychiatric:  Reports: anxiety, depressed


Allergies:  


Coded Allergies:  


     CODEINE (Verified  Allergy, Mild, Itching, 12/26/14)


Subjective


the pt is doing much better. No anxiety.





Objective





Last 24 Hour Vital Signs








  Date Time  Temp Pulse Resp B/P (MAP) Pulse Ox O2 Delivery O2 Flow Rate FiO2


 


4/30/18 12:00 98.2 63 18 108/64 100   





 98.2       


 


4/30/18 10:40    120/61    


 


4/30/18 09:51 98.1       


 


4/30/18 09:00  63  108/64    


 


4/30/18 09:00    108/64    


 


4/30/18 08:52 100.0       


 


4/30/18 08:00 100.0 68 18 110/56 98   





 100.0       


 


4/30/18 04:16 98.6 66 16 114/59 98   





 98.6       


 


4/30/18 00:00      Room Air  


 


4/30/18 00:00 98.4 63 15 116/58 99   





 98.4       


 


4/29/18 20:00 98.6 68 16 110/57 98   





 98.6       


 


4/29/18 20:00      Room Air  


 


4/29/18 16:00 97.8 67 18 112/56 98   





 97.8       

















Intake and Output  


 


 4/29/18 4/30/18





 19:00 07:00


 


Intake Total 815 ml 


 


Output Total 400 ml 550 ml


 


Balance 415 ml -550 ml


 


  


 


Intake Oral 760 ml 


 


IV Total 55 ml 


 


Output Urine Total 400 ml 550 ml


 


# Bowel Movements  1








Height (Feet):  5


Height (Inches):  2.00


Weight (Pounds):  110


General Appearance:  no apparent distress, alert, confused











Phoenix Izquierdo M.D. Apr 30, 2018 15:30

## 2018-04-30 NOTE — INFECTIOUS DISEASES PROG NOTE
Assessment/Plan


Assessment/Plan


A;


E. coli UTI


bladder prolapse


Cirrhosis


Osteoporosis 


compression fracture


dementia


P;


discontinue Rocephin 


in case of continuance of fever will reat urine culture





Subjective


ROS Limited/Unobtainable:  No


Constitutional:  Reports: fever, other - low grade in am


Respiratory:  Reports: no symptoms


Gastrointestinal/Abdominal:  Reports: no symptoms


Genitourinary:  Reports: no symptoms


Musculoskeletal:  Reports: pain, other - in thighs


Allergies:  


Coded Allergies:  


     CODEINE (Verified  Allergy, Mild, Itching, 12/26/14)





Objective


Vital Signs





Last 24 Hour Vital Signs








  Date Time  Temp Pulse Resp B/P (MAP) Pulse Ox O2 Delivery O2 Flow Rate FiO2


 


4/30/18 12:00 98.2 63 18 108/64 100   





 98.2       


 


4/30/18 10:40    120/61    


 


4/30/18 09:51 98.1       


 


4/30/18 09:00  63  108/64    


 


4/30/18 09:00    108/64    


 


4/30/18 08:52 100.0       


 


4/30/18 08:00 100.0 68 18 110/56 98   





 100.0       


 


4/30/18 04:16 98.6 66 16 114/59 98   





 98.6       


 


4/30/18 00:00      Room Air  


 


4/30/18 00:00 98.4 63 15 116/58 99   





 98.4       


 


4/29/18 20:00 98.6 68 16 110/57 98   





 98.6       


 


4/29/18 20:00      Room Air  


 


4/29/18 16:00 97.8 67 18 112/56 98   





 97.8       








Height (Feet):  5


Height (Inches):  2.00


Weight (Pounds):  110


General Appearance:  no acute distress


HEENT:  mucous membranes moist


Respiratory/Chest:  lungs clear


Cardiovascular:  normal rate


Abdomen:  soft, non tender


Extremities:  no edema


Neurologic/Psychiatric:  alert, responsive





Current Medications








 Medications


  (Trade)  Dose


 Ordered  Sig/Carl


 Route


 PRN Reason  Start Time


 Stop Time Status Last Admin


Dose Admin


 


 Acetaminophen


  (Tylenol)  650 mg  QIDPRN  PRN


 ORAL


 Mild Pain/Temp > 100.5  4/25/18 09:15


 5/25/18 09:14  4/30/18 08:52


 


 


 Amlodipine


 Besylate


  (Norvasc)  2.5 mg  DAILY


 ORAL


   4/30/18 09:00


 5/30/18 08:59   


 


 


 Aspirin


  (Ecotrin)  81 mg  DAILY


 ORAL


   4/25/18 09:00


 5/25/18 08:59  4/30/18 08:52


 


 


 Atorvastatin


 Calcium


  (Lipitor)  10 mg  QHS


 ORAL


   4/24/18 21:00


 5/24/18 20:59  4/29/18 20:49


 


 


 Ceftriaxone


 Sodium 1 gm/


 Dextrose  55 ml @ 


 110 mls/hr  Q24H


 IVPB


   4/26/18 14:30


 5/3/18 14:29  4/30/18 14:56


 


 


 Docusate Sodium


  (Colace)  100 mg  BID


 ORAL


   4/24/18 18:00


 5/24/18 17:59  4/30/18 08:52


 


 


 Donepezil HCl


  (Aricept)  10 mg  DAILY


 ORAL


   4/25/18 09:00


 5/25/18 08:59  4/30/18 08:52


 


 


 Fish Oil


  (Fish Oil)  1,000 mg  DAILY


 ORAL


   4/25/18 09:00


 5/25/18 08:59  4/30/18 08:53


 


 


 Gabapentin


  (Neurontin)  100 mg  BID


 ORAL


   4/24/18 18:00


 5/24/18 17:59  4/30/18 08:53


 


 


 Irbesartan


  (Avapro)  150 mg  DAILY


 ORAL


   4/25/18 09:00


 5/25/18 08:59  4/29/18 10:27


 


 


 Megestrol Acetate


  (Megace)  40 mg  TWICE A  DAY


 ORAL


   4/24/18 18:00


 5/24/18 17:59  4/30/18 08:53


 


 


 Menthol/Methyl


 Salicylate


  (Bengay)  1 applic  FOUR TIMES A  DAY


 TOPIC


   4/27/18 09:00


 5/27/18 08:59  4/30/18 13:28


 


 


 Oxcarbazepine


  (Trileptal)  150 mg  DAILY


 ORAL


   4/25/18 09:00


 5/25/18 08:59  4/30/18 08:52


 


 


 Risperidone


  (RisperDAL)  0.5 mg  Q12H  PRN


 ORAL


 Agitation  4/24/18 17:00


 5/24/18 16:59   


 


 


 Thiamine HCl


  (Vitamin B1)  100 mg  DAILY


 ORAL


   4/25/18 09:00


 5/25/18 08:59  4/30/18 08:53


 


 


 Vitamin D


  (Vitamin D)  1,000 intlu  DAILY


 ORAL


   4/25/18 09:00


 5/25/18 08:59  4/30/18 08:53


 

















MACIE THORNE Apr 30, 2018 15:00

## 2018-04-30 NOTE — GENERAL PROGRESS NOTE
Assessment/Plan


Assessment/Plan


(1) Multiple Joint OA


(2) Multiple Joint Pain


She will be continued on Bengay and Tylenol as needed


D/w Dr. Christie he concurred.





Subjective


Date patient seen:  Apr 30, 2018


Time patient seen:  08:00 - am


Allergies:  


Coded Allergies:  


     CODEINE (Verified  Allergy, Mild, Itching, 12/26/14)


Subjective


REVIEW OF SYSTEMS:  Denies rash, fever, chills, sweating, dizziness,


drowsiness, blurred vision, sore throat, or change in weight.  She is


complaining of joint pain.





SUBJECTIVE: Patient is in bed and reports that the pain has been 


controlled on the medications.





Objective





Last 24 Hour Vital Signs








  Date Time  Temp Pulse Resp B/P (MAP) Pulse Ox O2 Delivery O2 Flow Rate FiO2


 


4/30/18 08:52 100.0       


 


4/30/18 08:00 100.0 68 18 110/56 98   





 100.0       


 


4/30/18 04:16 98.6 66 16 114/59 98   





 98.6       


 


4/30/18 00:00      Room Air  


 


4/30/18 00:00 98.4 63 15 116/58 99   





 98.4       


 


4/29/18 20:00 98.6 68 16 110/57 98   





 98.6       


 


4/29/18 20:00      Room Air  


 


4/29/18 16:00 97.8 67 18 112/56 98   





 97.8       


 


4/29/18 12:00 99.0 69 19 129/60 100   





 99.0       


 


4/29/18 10:27    131/72    


 


4/29/18 10:20  64  131/72    


 


4/29/18 09:45    131/72    

















Intake and Output  


 


 4/29/18 4/30/18





 19:00 07:00


 


Intake Total 815 ml 


 


Output Total 400 ml 550 ml


 


Balance 415 ml -550 ml


 


  


 


Intake Oral 760 ml 


 


IV Total 55 ml 


 


Output Urine Total 400 ml 550 ml


 


# Bowel Movements  1








Height (Feet):  5


Height (Inches):  2.00


Weight (Pounds):  110


Objective


GENERAL:  Alert, awake, and oriented.


HEENT:  PERRLA.


NECK:  Range of motion is full in all directions.  No tenderness to


paracervical muscles.  No adenopathy.


LUNGS:  Decreased breath sounds bilaterally.


HEART:  Regular.


ABDOMEN:  Benign.


BACK:  Range of motion is decreased in flexion and extension.


EXTREMITIES:  Upper and lower extremity range of motion is decreased due to


the patient's pain and condition.  No cyanosis.  No clubbing.  No edema.


Sensory is intact.   Reflexes are not obtainable.  No adenopathy.











KVNG DOMÍNGUEZ Apr 30, 2018 09:00

## 2018-05-02 NOTE — DISCHARGE SUMMARY
Discharge Summary


DATE OF ADMISSION: 04/24/2018





DATE OF DISCHARGE: 04/30/2018





REASON FOR ADMISSION: 


84 years old female with history of hypertension, CVA ,asthma, history of 

bladder prolapse,   presented to ED with  bilateral groin pain and hematuria 

that started the morning prior to presentation.  Patient straight catheterizes 

herself for the past 13 years. She has a bladder prolapse issue and had 

difficulty emptying her bladder after a failed surgery to correct it. 


She denied fever,chills,nausea, vomiting, back pain, diarrhea ,  rectal 

bleeding or gynecological complaints.  She reported constant aching mild-to-

moderate intensity pain.  


She came because she was worried about blood she saw while catheterizing 

herself.  Upon evaluation in emergency room patient was afebrile.  Laboratory 

workup revealed no leukocytosis, stable hemoglobin and hematocrit.  Urinalysis 

was positive for UTI.  EKG revealed normal sinus rhythm, no acute ischemic 

changes.  Patient had a history of multidrug  resistant Escherichia coli UTI.  

CT of the abdomen and pelvis revealed thick bladder,  indicating cystitis or 

chronic bladder outlet obstruction.  Dependent calcifications were seen within 

the bladder lumen. Some of these may also be within the bladder wall.Cirrhosis.

  Evidence of portal hypertension.  Colonic diverticulosis without evidence of 

diverticulitis.  Moderate retained stool.  Anterior wedge/burst compression 

fracture deformity of the T11 vertebral body , likely chronic.   


Patient was started on empiric intravenous antibiotic and admitted for further 

management with diagnosis of complicated urinary tract infection, hematuria.


 


CONSULTANTS:


ID specialist Dr. Doherty


nephrologist Dr. Byrne


psychiatrist Dr. Izquierdo


pain specialist Dr. Christie


urologist dr Bettencourt


 


[facility] COURSE: 


Patient was admitted.  Urology and nephrology consults were requested along 

with infectious disease specialist.  Urine culture revealed Escherichia coli 

and mixed gram-positive organism.  Patient was treated with intravenous 

antibiotics as per infectious disease specialist recommendation.  Urologist 

seen the patient upon presentation.  He closely reviewed  imaging and stated 

that patient had nonfunctional bladder;  given bladder stones and chronic 

retention Torres was placed. Urologist recommended to keep Torres until  

treatment for UTI completed.    Given the complex nature of urinary tract 

infection, likely drug-resistant, he recommended 10-14 days treatment of 

antibiotics.  Patient to follow-up with outpatient  urologist Dr. Callahan.  

Hematuria resolved.  Hemoglobin and hematocrit stable, prior to discharge 

hemoglobin 10.7 , hematocrit 31.9.





Nephrologist closely followed.  Renal parameters and electrolytes were closely 

monitored. Electrolytes were corrected as needed. Nephrotoxics were avoided.  

Renal parameters remained stable.  Blood pressure was managed with calcium 

channel blocker and angiotensin receptor blocker and remained stable.  Bowel 

regimen instituted.





Pain specialist seen the patient for chronic pain syndrome.  Pain management 

provided as per pain specialist recommendations.  Pain was controlled.





Psychiatrist seen and evaluated the patient, and diagnosed patient with 

encephalopathy and dementia with behavioral disturbances.  Aricept was 

continued along with Risperdal on as needed basis.   





Patient clinically improved and was stable for discharge  to the skilled 

nursing facility.





FINAL DIAGNOSES: 


Complicated urinary tract infection with Escherichia coli


Hematuria, resolved


Bladder prolapse


Nonfunctional bladder


Encephalopathy


Dementia with behavioral disturbances


Chronic pain syndrome


Multiply joint osteoarthritis


Hypertension





DISCHARGE MEDICATIONS:


See Medication Reconciliation list.





DISCHARGE INSTRUCTIONS:


Patient was discharged to skilled nursing facility.  Follow up with medical 

doctor at the facility.  Follow up with  urologist Dr. Callahan as outpatient.





I have been assigned to dictate discharge summary for this account. I was not 

involved in the patient's management.











Leydi Hopkins NP ([place]) May 2, 2018 07:50